# Patient Record
Sex: MALE | Race: WHITE | Employment: FULL TIME | URBAN - METROPOLITAN AREA
[De-identification: names, ages, dates, MRNs, and addresses within clinical notes are randomized per-mention and may not be internally consistent; named-entity substitution may affect disease eponyms.]

---

## 2019-01-07 ENCOUNTER — TRANSFERRED RECORDS (OUTPATIENT)
Dept: HEALTH INFORMATION MANAGEMENT | Facility: CLINIC | Age: 34
End: 2019-01-07

## 2019-02-18 ENCOUNTER — TRANSFERRED RECORDS (OUTPATIENT)
Dept: HEALTH INFORMATION MANAGEMENT | Facility: CLINIC | Age: 34
End: 2019-02-18

## 2019-02-25 ENCOUNTER — TELEPHONE (OUTPATIENT)
Dept: FAMILY MEDICINE | Facility: CLINIC | Age: 34
End: 2019-02-25

## 2019-02-25 NOTE — TELEPHONE ENCOUNTER
Patient wants to know if he could establish care with Dr. Miller.  States that his family sees her.  Please call or mychart decision to patient.  He is scheduled with Julian for a physical on Thursday because he needed to get in this week, but for future would like to be with Dr. Narvaez.

## 2019-02-28 ENCOUNTER — OFFICE VISIT (OUTPATIENT)
Dept: FAMILY MEDICINE | Facility: CLINIC | Age: 34
End: 2019-02-28
Payer: COMMERCIAL

## 2019-02-28 VITALS
TEMPERATURE: 97.6 F | SYSTOLIC BLOOD PRESSURE: 121 MMHG | WEIGHT: 154 LBS | DIASTOLIC BLOOD PRESSURE: 80 MMHG | OXYGEN SATURATION: 99 % | BODY MASS INDEX: 24.17 KG/M2 | RESPIRATION RATE: 12 BRPM | HEIGHT: 67 IN | HEART RATE: 55 BPM

## 2019-02-28 DIAGNOSIS — Z53.9 ERRONEOUS ENCOUNTER--DISREGARD: Primary | ICD-10-CM

## 2019-02-28 ASSESSMENT — ENCOUNTER SYMPTOMS
ABDOMINAL PAIN: 0
CHILLS: 0
PARESTHESIAS: 0
HEADACHES: 0
ARTHRALGIAS: 0
DIZZINESS: 0
WEAKNESS: 0
PALPITATIONS: 0
CONSTIPATION: 0
EYE PAIN: 0
JOINT SWELLING: 0
HEARTBURN: 0
HEMATOCHEZIA: 0
FREQUENCY: 0
NAUSEA: 0
NERVOUS/ANXIOUS: 0
FEVER: 0
DYSURIA: 0
SHORTNESS OF BREATH: 0
DIARRHEA: 0
COUGH: 0
SORE THROAT: 0
HEMATURIA: 0
MYALGIAS: 0

## 2019-02-28 ASSESSMENT — MIFFLIN-ST. JEOR: SCORE: 1589.23

## 2019-02-28 NOTE — PROGRESS NOTES
Patient was under the impression preoperative exam was needed prior to scheduling his surgery. Upon call of surgeon, the earliest date would be 4/3/19 which is greater than required 30 day time. Patient was informed he will need to return for a separate preop, but elects to cancel preventative physical today and will reschedule preop in the near future.     -Julian Murillo, DIDI  This encounter was opened in error. Please disregard.

## 2019-02-28 NOTE — PROGRESS NOTES
SUBJECTIVE:   CC: Ismael Britton is an 34 year old male who presents for preventative health visit.     Physical   Annual:     Getting at least 3 servings of Calcium per day:  Yes    Bi-annual eye exam:  Yes    Dental care twice a year:  Yes    Sleep apnea or symptoms of sleep apnea:  None    Diet:  Regular (no restrictions)    Frequency of exercise:  2-3 days/week    Duration of exercise:  45-60 minutes    Taking medications regularly:  Yes    Medication side effects:  None    Additional concerns today:  No    PHQ-2 Total Score: 0          Today's PHQ-2 Score:   PHQ-2 ( 1999 [employer]) 2/28/2019   Q1: Little interest or pleasure in doing things 0   Q2: Feeling down, depressed or hopeless 0   PHQ-2 Score 0   Q1: Little interest or pleasure in doing things Not at all   Q2: Feeling down, depressed or hopeless Not at all   PHQ-2 Score 0       Abuse: Current or Past(Physical, Sexual or Emotional)- No  Do you feel safe in your environment? Yes    Social History     Tobacco Use     Smoking status: Never Smoker     Smokeless tobacco: Never Used   Substance Use Topics     Alcohol use: No     Alcohol Use 2/28/2019   If you drink alcohol do you typically have greater than 3 drinks per day OR greater than 7 drinks per week? Not Applicable   {add AUDIT responses (Optional) (A score of 7 for adult men is an indication of hazardous drinking; a score of 8 or more is an indication of an alcohol use disorder.  A score of 7 or more for adult women is an indication of hazardous drinking or an alchohol use disorder):547633}    Last PSA: No results found for: PSA    Reviewed orders with patient. Reviewed health maintenance and updated orders accordingly - Yes  {Chronicprobdata (Optional):899018}    Reviewed and updated as needed this visit by clinical staff         Reviewed and updated as needed this visit by Provider        {HISTORY OPTIONS (Optional):178977}    Review of Systems   Constitutional: Negative for chills and fever.   HENT:  "Negative for congestion, ear pain, hearing loss and sore throat.    Eyes: Negative for pain and visual disturbance.   Respiratory: Negative for cough and shortness of breath.    Cardiovascular: Negative for chest pain, palpitations and peripheral edema.   Gastrointestinal: Negative for abdominal pain, constipation, diarrhea, heartburn, hematochezia and nausea.   Genitourinary: Negative for discharge, dysuria, frequency, genital sores, hematuria, impotence and urgency.   Musculoskeletal: Negative for arthralgias, joint swelling and myalgias.   Skin: Negative for rash.   Neurological: Negative for dizziness, weakness, headaches and paresthesias.   Psychiatric/Behavioral: Negative for mood changes. The patient is not nervous/anxious.      {MALE ROS (Optional):725251::\"CONSTITUTIONAL: NEGATIVE for fever, chills, change in weight\",\"INTEGUMENTARY/SKIN: NEGATIVE for worrisome rashes, moles or lesions\",\"EYES: NEGATIVE for vision changes or irritation\",\"ENT: NEGATIVE for ear, mouth and throat problems\",\"RESP: NEGATIVE for significant cough or SOB\",\"CV: NEGATIVE for chest pain, palpitations or peripheral edema\",\"GI: NEGATIVE for nausea, abdominal pain, heartburn, or change in bowel habits\",\" male: negative for dysuria, hematuria, decreased urinary stream, erectile dysfunction, urethral discharge\",\"MUSCULOSKELETAL: NEGATIVE for significant arthralgias or myalgia\",\"NEURO: NEGATIVE for weakness, dizziness or paresthesias\",\"PSYCHIATRIC: NEGATIVE for changes in mood or affect\"}    OBJECTIVE:   There were no vitals taken for this visit.    Physical Exam  {Exam Choices (Optional):428548}    {Diagnostic Test Results (Optional):330502::\"Diagnostic Test Results:\",\"none \"}    ASSESSMENT/PLAN:   {Dia Picklist:193976}    COUNSELING:   {MALE COUNSELING MESSAGES:875254::\"Reviewed preventive health counseling, as reflected in patient instructions\"}    BP Readings from Last 1 Encounters:   03/06/13 120/80     Estimated body mass index is " "25.31 kg/m  as calculated from the following:    Height as of 3/6/13: 1.695 m (5' 6.75\").    Weight as of 3/6/13: 72.8 kg (160 lb 6.4 oz).    {BP Counseling- Complete if BP >= 120/80  (Optional):786013}  {Weight Management Plan (ACO) Complete if BMI is abnormal-  Ages 18-64  BMI >24.9.  Age 65+ with BMI <23 or >30 (Optional):422270}     reports that  has never smoked. he has never used smokeless tobacco.  {Tobacco Cessation -- Complete if patient is a smoker (Optional):084732}    Counseling Resources:  ATP IV Guidelines  Pooled Cohorts Equation Calculator  FRAX Risk Assessment  ICSI Preventive Guidelines  Dietary Guidelines for Americans, 2010  USDA's MyPlate  ASA Prophylaxis  Lung CA Screening    Leonard Murillo PA-C  [facility]"

## 2019-03-25 ENCOUNTER — OFFICE VISIT (OUTPATIENT)
Dept: FAMILY MEDICINE | Facility: CLINIC | Age: 34
End: 2019-03-25
Payer: COMMERCIAL

## 2019-03-25 VITALS
SYSTOLIC BLOOD PRESSURE: 102 MMHG | HEIGHT: 67 IN | RESPIRATION RATE: 14 BRPM | BODY MASS INDEX: 24.64 KG/M2 | TEMPERATURE: 98.1 F | WEIGHT: 157 LBS | OXYGEN SATURATION: 98 % | HEART RATE: 62 BPM | DIASTOLIC BLOOD PRESSURE: 70 MMHG

## 2019-03-25 DIAGNOSIS — Z01.818 PREOP GENERAL PHYSICAL EXAM: Primary | ICD-10-CM

## 2019-03-25 DIAGNOSIS — J34.2 DEVIATED NASAL SEPTUM: ICD-10-CM

## 2019-03-25 DIAGNOSIS — J32.0 CHRONIC MAXILLARY SINUSITIS: ICD-10-CM

## 2019-03-25 DIAGNOSIS — D75.839 THROMBOCYTOSIS: ICD-10-CM

## 2019-03-25 LAB
BASOPHILS # BLD AUTO: 0.1 10E9/L (ref 0–0.2)
BASOPHILS NFR BLD AUTO: 0.6 %
DIFFERENTIAL METHOD BLD: ABNORMAL
EOSINOPHIL # BLD AUTO: 0.5 10E9/L (ref 0–0.7)
EOSINOPHIL NFR BLD AUTO: 5 %
ERYTHROCYTE [DISTWIDTH] IN BLOOD BY AUTOMATED COUNT: 13.2 % (ref 10–15)
HCT VFR BLD AUTO: 43.6 % (ref 40–53)
HGB BLD-MCNC: 15 G/DL (ref 13.3–17.7)
LYMPHOCYTES # BLD AUTO: 2.6 10E9/L (ref 0.8–5.3)
LYMPHOCYTES NFR BLD AUTO: 27.8 %
MCH RBC QN AUTO: 29.9 PG (ref 26.5–33)
MCHC RBC AUTO-ENTMCNC: 34.4 G/DL (ref 31.5–36.5)
MCV RBC AUTO: 87 FL (ref 78–100)
MONOCYTES # BLD AUTO: 0.8 10E9/L (ref 0–1.3)
MONOCYTES NFR BLD AUTO: 9.1 %
NEUTROPHILS # BLD AUTO: 5.3 10E9/L (ref 1.6–8.3)
NEUTROPHILS NFR BLD AUTO: 57.5 %
PLATELET # BLD AUTO: 710 10E9/L (ref 150–450)
RBC # BLD AUTO: 5.01 10E12/L (ref 4.4–5.9)
WBC # BLD AUTO: 9.2 10E9/L (ref 4–11)

## 2019-03-25 PROCEDURE — 85025 COMPLETE CBC W/AUTO DIFF WBC: CPT | Performed by: PHYSICIAN ASSISTANT

## 2019-03-25 PROCEDURE — 99204 OFFICE O/P NEW MOD 45 MIN: CPT | Performed by: PHYSICIAN ASSISTANT

## 2019-03-25 PROCEDURE — 36415 COLL VENOUS BLD VENIPUNCTURE: CPT | Performed by: PHYSICIAN ASSISTANT

## 2019-03-25 ASSESSMENT — MIFFLIN-ST. JEOR: SCORE: 1602.84

## 2019-03-25 NOTE — PROGRESS NOTES
[facility]  [address] [id number] [phone]  Dept: [phone]    PRE-OP EVALUATION:  Today's date: 3/25/2019    Ismael Britton (: 1985) presents for pre-operative evaluation assessment as requested by Dr. Arias.  He requires evaluation and anesthesia risk assessment prior to undergoing surgery/procedure for treatment of nose .    Fax number for surgical facility: [phone] and [phone]  Primary Physician: Lady Gonzales  Type of Anesthesia Anticipated: General    Patient has a Health Care Directive or Living Will:  NO    Preop Questions 3/25/2019   1.  Do you have a history of Heart attack, stroke, stent, coronary bypass surgery, or other heart surgery? No   2.  Do you ever have any pain or discomfort in your chest? No   3.  Do you have a history of  Heart Failure? No   4.   Are you troubled by shortness of breath when:  walking on a level surface, or up a slight hill, or at night? No   5.  Do you currently have a cold, bronchitis or other respiratory infection? No   6.  Do you have a cough, shortness of breath, or wheezing? No   7.  Do you sometimes get pains in the calves of your legs when you walk? No   8. Do you or anyone in your family have previous history of blood clots? No   9.  Do you or does anyone in your family have a serious bleeding problem such as prolonged bleeding following surgeries or cuts? No   10. Have you ever had problems with anemia or been told to take iron pills? No   11. Have you had any abnormal blood loss such as black, tarry or bloody stools? No   12. Have you ever had a blood transfusion? No   13. Have you or any of your relatives ever had problems with anesthesia? No   14. Do you have sleep apnea, excessive snoring or daytime drowsiness? No   15. Do you have any prosthetic heart valves? No   16. Do you have prosthetic joints? No         HPI:     HPI related to upcoming procedure: history of chronic sinusitis  and deviated septum. The above procedure was deemed the next best step in management.       MEDICAL HISTORY:     Patient Active Problem List    Diagnosis Date Noted     CARDIOVASCULAR SCREENING; LDL GOAL LESS THAN 160 10/31/2010     Priority: Medium     Anxiety 09/10/2010     Priority: Medium     Intermittent asthma 04/06/2010     Priority: Medium     Quality NRK        Past Medical History:   Diagnosis Date     Anxiety 9/2010     Asthma, mild intermittent      No past surgical history on file.  Current Outpatient Medications   Medication Sig Dispense Refill     cetirizine (ZYRTEC) 10 MG tablet Take 1 tablet by mouth every evening. 30 tablet 1     fluticasone (FLONASE) 50 MCG/ACT nasal spray 2 sprays by Both Nostrils route daily. 1 Package 12     OTC products: no recent use of OTC ASA, NSAIDS or Steroids    No Known Allergies   Latex Allergy: NO    Social History     Tobacco Use     Smoking status: Never Smoker     Smokeless tobacco: Never Used   Substance Use Topics     Alcohol use: No     History   Drug Use No       REVIEW OF SYSTEMS:   CONSTITUTIONAL: NEGATIVE for fever, chills, change in weight  INTEGUMENTARY/SKIN: NEGATIVE for worrisome rashes, moles or lesions  EYES: NEGATIVE for vision changes or irritation  ENT/MOUTH: chronic congestion. Otherwise, NEGATIVE for ear, mouth and throat problems  RESP: NEGATIVE for significant cough or SOB  CV: NEGATIVE for chest pain, palpitations or peripheral edema  GI: NEGATIVE for nausea, abdominal pain, heartburn, or change in bowel habits  : NEGATIVE for frequency, dysuria, or hematuria  MUSCULOSKELETAL: NEGATIVE for significant arthralgias or myalgia  NEURO: NEGATIVE for weakness, dizziness or paresthesias  ENDOCRINE: NEGATIVE for temperature intolerance, skin/hair changes  HEME: NEGATIVE for bleeding problems  PSYCHIATRIC: NEGATIVE for changes in mood or affect    EXAM:   /70 (BP Location: Right arm, Patient Position: Chair, Cuff Size: Adult Regular)   Pulse 62  "  Temp 98.1  F (36.7  C) (Oral)   Resp 14   Ht 1.689 m (5' 6.5\")   Wt 71.2 kg (157 lb)   SpO2 98%   BMI 24.96 kg/m      GENERAL APPEARANCE: healthy, alert and no distress     EYES: EOMI,  PERRL     HENT: ear canals and TM's normal and nasal mucosa with edema and pale     NECK: no adenopathy, no asymmetry, masses, or scars and thyroid normal to palpation     RESP: lungs clear to auscultation - no rales, rhonchi or wheezes     CV: regular rates and rhythm, normal S1 S2, no S3 or S4 and no murmur, click or rub     ABDOMEN:  soft, nontender, no HSM or masses and bowel sounds normal     MS: extremities normal- no gross deformities noted, no evidence of inflammation in joints, FROM in all extremities.     SKIN: no suspicious lesions or rashes     NEURO: Normal strength and tone, sensory exam grossly normal, mentation intact and speech normal     PSYCH: mentation appears normal. and affect normal/bright     LYMPHATICS: No cervical adenopathy    DIAGNOSTICS:     EKG: Not indicated due to non-vascular surgery and low risk of event (age <65 and without cardiac risk factors)  Labs Resulted Today:   Results for orders placed or performed in visit on 03/25/19   CBC with platelets and differential   Result Value Ref Range    WBC 9.2 4.0 - 11.0 10e9/L    RBC Count 5.01 4.4 - 5.9 10e12/L    Hemoglobin 15.0 13.3 - 17.7 g/dL    Hematocrit 43.6 40.0 - 53.0 %    MCV 87 78 - 100 fl    MCH 29.9 26.5 - 33.0 pg    MCHC 34.4 31.5 - 36.5 g/dL    RDW 13.2 10.0 - 15.0 %    Platelet Count 710 (H) 150 - 450 10e9/L    % Neutrophils 57.5 %    % Lymphocytes 27.8 %    % Monocytes 9.1 %    % Eosinophils 5.0 %    % Basophils 0.6 %    Absolute Neutrophil 5.3 1.6 - 8.3 10e9/L    Absolute Lymphocytes 2.6 0.8 - 5.3 10e9/L    Absolute Monocytes 0.8 0.0 - 1.3 10e9/L    Absolute Eosinophils 0.5 0.0 - 0.7 10e9/L    Absolute Basophils 0.1 0.0 - 0.2 10e9/L    Diff Method Automated Method        IMPRESSION:   Reason for surgery/procedure: chronic sinusitis " and deviated septum.   Diagnosis/reason for consult: preoperative exam for the above procedure.     The proposed surgical procedure is considered INTERMEDIATE risk.    REVISED CARDIAC RISK INDEX  The patient has the following serious cardiovascular risks for perioperative complications such as (MI, PE, VFib and 3  AV Block):  No serious cardiac risks  INTERPRETATION: 0 risks: Class I (very low risk - 0.4% complication rate)    The patient has the following additional risks for perioperative complications:  No identified additional risks      ICD-10-CM    1. Preop general physical exam Z01.818 CBC with platelets and differential     CANCELED: Hemoglobin   2. Chronic maxillary sinusitis J32.0    3. Deviated nasal septum J34.2        RECOMMENDATIONS:     NPO after midnight. No nsaids or asa until after procedure. No alcohol 24 hours prior.     Of note, patient's platelet count was elevated. Unclear cause of symptoms. No recent illness or blood loss. Possible lab error. Should not impact surgery, but having patient return to clinic later this week for recheck.     APPROVAL GIVEN to proceed with proposed procedure, without further diagnostic evaluation       Signed Electronically by: Leonard Murillo PA-C    Copy of this evaluation report is provided to requesting physician.    [facility] Preop Guidelines    Revised Cardiac Risk Index

## 2019-03-25 NOTE — PATIENT INSTRUCTIONS
Before Your Surgery      Call your surgeon if there is any change in your health. This includes signs of a cold or flu (such as a sore throat, runny nose, cough, rash or fever).    Do not smoke, drink alcohol or take over the counter medicine (unless your surgeon or primary care doctor tells you to) for the 24 hours before and after surgery.    If you take prescribed drugs: Follow your doctor s orders about which medicines to take and which to stop until after surgery.    Eating and drinking prior to surgery: follow the instructions from your surgeon    Take a shower or bath the night before surgery. Use the soap your surgeon gave you to gently clean your skin. If you do not have soap from your surgeon, use your regular soap. Do not shave or scrub the surgery site.  Wear clean pajamas and have clean sheets on your bed.     Fax preop Notes to:  [phone]  & [phone]

## 2019-03-25 NOTE — LETTER
My Asthma Action Plan  Name: Ismael Britton   YOB: 1985  Date: 3/25/2019   My doctor: Leonard uMrillo PA-C   My clinic: [facility]        My Control Medicine:   My Rescue Medicine:    My Asthma Severity:   Avoid your asthma triggers:                GREEN ZONE   Good Control    I feel good    No cough or wheeze    Can work, sleep and play without asthma symptoms       Take your asthma control medicine every day.     1. If exercise triggers your asthma, take your rescue medication    15 minutes before exercise or sports, and    During exercise if you have asthma symptoms  2. Spacer to use with inhaler: If you have a spacer, make sure to use it with your inhaler             YELLOW ZONE Getting Worse  I have ANY of these:    I do not feel good    Cough or wheeze    Chest feels tight    Wake up at night   1. Keep taking your Green Zone medications  2. Start taking your rescue medicine:    every 20 minutes for up to 1 hour. Then every 4 hours for 24-48 hours.  3. If you stay in the Yellow Zone for more than 12-24 hours, contact your doctor.  4. If you do not return to the Green Zone in 12-24 hours or you get worse, start taking your oral steroid medicine if prescribed by your provider.           RED ZONE Medical Alert - Get Help  I have ANY of these:    I feel awful    Medicine is not helping    Breathing getting harder    Trouble walking or talking    Nose opens wide to breathe       1. Take your rescue medicine NOW  2. If your provider has prescribed an oral steroid medicine, start taking it NOW  3. Call your doctor NOW  4. If you are still in the Red Zone after 20 minutes and you have not reached your doctor:    Take your rescue medicine again and    Call 911 or go to the emergency room right away    See your regular doctor within 2 weeks of an Emergency Room or Urgent Care visit for follow-up treatment.          Annual Reminders:  Meet with Asthma Educator,  Flu Shot in the  Fall, consider Pneumonia Vaccination for patients with asthma (aged 19 and older).    Pharmacy: [facility] [id number] [place] [address] [id number] AT [facility] & [place] [address]                      Asthma Triggers  How To Control Things That Make Your Asthma Worse    Triggers are things that make your asthma worse.  Look at the list below to help you find your triggers and what you can do about them.  You can help prevent asthma flare-ups by staying away from your triggers.      Trigger                                                          What you can do   Cigarette Smoke  Tobacco smoke can make asthma worse. Do not allow smoking in your home, car or around you.  Be sure no one smokes at a child s day care or school.  If you smoke, ask your health care provider for ways to help you quit.  Ask family members to quit too.  Ask your health care provider for a referral to Quit Plan to help you quit smoking, or call 4-124-501-PLAN.     Colds, Flu, Bronchitis  These are common triggers of asthma. Wash your hands often.  Don t touch your eyes, nose or mouth.  Get a flu shot every year.     Dust Mites  These are tiny bugs that live in cloth or carpet. They are too small to see. Wash sheets and blankets in hot water every week.   Encase pillows and mattress in dust mite proof covers.  Avoid having carpet if you can. If you have carpet, vacuum weekly.   Use a dust mask and HEPA vacuum.   Pollen and Outdoor Mold  Some people are allergic to trees, grass, or weed pollen, or molds. Try to keep your windows closed.  Limit time out doors when pollen count is high.   Ask you health care provider about taking medicine during allergy season.     Animal Dander  Some people are allergic to skin flakes, urine or saliva from pets with fur or feathers. Keep pets with fur or feathers out of your home.    If you can t keep the pet outdoors, then keep the pet out of your bedroom.  Keep the bedroom door closed.  Keep pets  off cloth furniture and away from stuffed toys.     Mice, Rats, and Cockroaches  Some people are allergic to the waste from these pests.   Cover food and garbage.  Clean up spills and food crumbs.  Store grease in the refrigerator.   Keep food out of the bedroom.   Indoor Mold  This can be a trigger if your home has high moisture. Fix leaking faucets, pipes, or other sources of water.   Clean moldy surfaces.  Dehumidify basement if it is damp and smelly.   Smoke, Strong Odors, and Sprays  These can reduce air quality. Stay away from strong odors and sprays, such as perfume, powder, hair spray, paints, smoke incense, paint, cleaning products, candles and new carpet.   Exercise or Sports  Some people with asthma have this trigger. Be active!  Ask your doctor about taking medicine before sports or exercise to prevent symptoms.    Warm up for 5-10 minutes before and after sports or exercise.     Other Triggers of Asthma  Cold air:  Cover your nose and mouth with a scarf.  Sometimes laughing or crying can be a trigger.  Some medicines and food can trigger asthma.

## 2019-03-26 ENCOUNTER — TELEPHONE (OUTPATIENT)
Dept: FAMILY MEDICINE | Facility: CLINIC | Age: 34
End: 2019-03-26

## 2019-03-26 DIAGNOSIS — D75.839 THROMBOCYTOSIS: ICD-10-CM

## 2019-03-26 LAB — PLATELET # BLD AUTO: 697 10E9/L (ref 150–450)

## 2019-03-26 PROCEDURE — 36415 COLL VENOUS BLD VENIPUNCTURE: CPT | Performed by: PHYSICIAN ASSISTANT

## 2019-03-26 PROCEDURE — 85049 AUTOMATED PLATELET COUNT: CPT | Performed by: PHYSICIAN ASSISTANT

## 2019-03-26 ASSESSMENT — ASTHMA QUESTIONNAIRES: ACT_TOTALSCORE: 25

## 2019-03-26 NOTE — TELEPHONE ENCOUNTER
Pt as instructed to have the platelet count rechecked later this week but came back the next day?   Component      Latest Ref Rng & Units 3/25/2019 3/26/2019   Platelet Count      150 - 450 10e9/L 710 (H) 697 (H)     Too soon?   Speedy Cardoza, RN

## 2019-03-26 NOTE — TELEPHONE ENCOUNTER
See result note from today. Did not see telephone note until after this was completed.     -Julian Murillo, PAC

## 2019-03-26 NOTE — TELEPHONE ENCOUNTER
Patient called looking for lab results from his labs done today, told him that they will contact him once the provider has made a comment.     Please see if Julian can take a look at this afternoon and call patient with results     Shante Calderon/NAYAN

## 2019-03-29 ENCOUNTER — TELEPHONE (OUTPATIENT)
Dept: ONCOLOGY | Facility: CLINIC | Age: 34
End: 2019-03-29

## 2019-03-29 DIAGNOSIS — D75.839 THROMBOCYTOSIS: ICD-10-CM

## 2019-03-29 LAB — PLATELET # BLD AUTO: 684 10E9/L (ref 150–450)

## 2019-03-29 PROCEDURE — 85049 AUTOMATED PLATELET COUNT: CPT | Performed by: PHYSICIAN ASSISTANT

## 2019-03-29 PROCEDURE — 36415 COLL VENOUS BLD VENIPUNCTURE: CPT | Performed by: PHYSICIAN ASSISTANT

## 2019-03-29 NOTE — TELEPHONE ENCOUNTER
ONCOLOGY INTAKE: Records Information      APPT INFORMATION: 4/12/19 at 1:15PM  Referring provider:  ISAAC Cuevas  Referring provider s clinic:  [facility]  Reason for visit/diagnosis:  Thrombocytosis    Were the records received with the referral (via [employer])? In [facility]    Has patient been seen for any external appt for this diagnosis (enter clinic/location)? No - per pt he has not been seen or treated for this outside of .

## 2019-04-06 NOTE — TELEPHONE ENCOUNTER
RECORDS STATUS - ALL OTHER DIAGNOSIS      RECORDS RECEIVED FROM: [facility]   DATE RECEIVED: 4/6/19   NOTES STATUS DETAILS   OFFICE NOTE from referring provider Complete [facility] 3/25 Dr. Murillo   OFFICE NOTE from medical oncologist N/A    DISCHARGE SUMMARY from hospital N/A    DISCHARGE REPORT from the ER N/A    OPERATIVE REPORT N/A    MEDICATION LIST Complete [facility]   CLINICAL TRIAL TREATMENTS TO DATE N/A    LABS     PATHOLOGY REPORTS N/A    ANYTHING RELATED TO DIAGNOSIS Complete Epic   GENONOMIC TESTING     TYPE: N/A    IMAGING (NEED IMAGES & REPORT)     CT SCANS N/A    MRI N/A    MAMMO N/A    ULTRASOUND N/A    PET N/A

## 2019-04-11 ENCOUNTER — PATIENT OUTREACH (OUTPATIENT)
Dept: ONCOLOGY | Facility: CLINIC | Age: 34
End: 2019-04-11

## 2019-04-11 NOTE — PROGRESS NOTES
Ismael called into the cancer clinic to verify appointment tomorrow with Dr. Alejo at 1:15pm. This writer verified and confirmed with the patient to arrive by 1pm. Ismael also inquired about his previous labs that were drawn. This writer notified him that his platelet count is high but he can ask Dr. Alejo tomorrow to go over the results. The patient verbalized understanding.     Mitzi Doran RN on 4/11/2019 at 4:01 PM  Mitzi Doran RN, BSN, [facility], LAc  Patient Care Coordinator  [facility]  [phone]

## 2019-04-12 ENCOUNTER — PRE VISIT (OUTPATIENT)
Dept: ONCOLOGY | Facility: CLINIC | Age: 34
End: 2019-04-12

## 2019-04-12 ENCOUNTER — HOSPITAL ENCOUNTER (OUTPATIENT)
Facility: CLINIC | Age: 34
Setting detail: SPECIMEN
Discharge: HOME | End: 2019-04-12
Attending: INTERNAL MEDICINE | Admitting: INTERNAL MEDICINE
Payer: COMMERCIAL

## 2019-04-12 ENCOUNTER — ONCOLOGY VISIT (OUTPATIENT)
Dept: ONCOLOGY | Facility: CLINIC | Age: 34
End: 2019-04-12
Attending: PHYSICIAN ASSISTANT
Payer: COMMERCIAL

## 2019-04-12 VITALS
TEMPERATURE: 98.8 F | BODY MASS INDEX: 24.23 KG/M2 | DIASTOLIC BLOOD PRESSURE: 72 MMHG | SYSTOLIC BLOOD PRESSURE: 122 MMHG | WEIGHT: 154.4 LBS | HEIGHT: 67 IN | HEART RATE: 60 BPM | OXYGEN SATURATION: 99 % | RESPIRATION RATE: 16 BRPM

## 2019-04-12 DIAGNOSIS — D75.839 THROMBOCYTOSIS: Primary | ICD-10-CM

## 2019-04-12 LAB
BASOPHILS # BLD AUTO: 0.1 10E9/L (ref 0–0.2)
BASOPHILS NFR BLD AUTO: 1.4 %
COPATH REPORT: NORMAL
CRP SERPL-MCNC: <2.9 MG/L (ref 0–8)
DIFFERENTIAL METHOD BLD: ABNORMAL
EOSINOPHIL # BLD AUTO: 0.4 10E9/L (ref 0–0.7)
EOSINOPHIL NFR BLD AUTO: 3.7 %
ERYTHROCYTE [DISTWIDTH] IN BLOOD BY AUTOMATED COUNT: 12.5 % (ref 10–15)
ERYTHROCYTE [SEDIMENTATION RATE] IN BLOOD BY WESTERGREN METHOD: 2 MM/H (ref 0–15)
FERRITIN SERPL-MCNC: 180 NG/ML (ref 26–388)
HCT VFR BLD AUTO: 46.7 % (ref 40–53)
HGB BLD-MCNC: 15.7 G/DL (ref 13.3–17.7)
IMM GRANULOCYTES # BLD: 0.1 10E9/L (ref 0–0.4)
IMM GRANULOCYTES NFR BLD: 0.5 %
IRON SATN MFR SERPL: 42 % (ref 15–46)
IRON SERPL-MCNC: 120 UG/DL (ref 35–180)
LDH SERPL L TO P-CCNC: 194 U/L (ref 85–227)
LYMPHOCYTES # BLD AUTO: 2.5 10E9/L (ref 0.8–5.3)
LYMPHOCYTES NFR BLD AUTO: 24.8 %
MCH RBC QN AUTO: 29.6 PG (ref 26.5–33)
MCHC RBC AUTO-ENTMCNC: 33.6 G/DL (ref 31.5–36.5)
MCV RBC AUTO: 88 FL (ref 78–100)
MONOCYTES # BLD AUTO: 0.9 10E9/L (ref 0–1.3)
MONOCYTES NFR BLD AUTO: 8.9 %
NEUTROPHILS # BLD AUTO: 6.2 10E9/L (ref 1.6–8.3)
NEUTROPHILS NFR BLD AUTO: 60.7 %
NRBC # BLD AUTO: 0 10*3/UL
NRBC BLD AUTO-RTO: 0 /100
PLATELET # BLD AUTO: 799 10E9/L (ref 150–450)
RBC # BLD AUTO: 5.31 10E12/L (ref 4.4–5.9)
RETICS # AUTO: 57.3 10E9/L (ref 25–95)
RETICS/RBC NFR AUTO: 1.1 % (ref 0.5–2)
TIBC SERPL-MCNC: 283 UG/DL (ref 240–430)
VIT B12 SERPL-MCNC: 1493 PG/ML (ref 193–986)
WBC # BLD AUTO: 10.3 10E9/L (ref 4–11)

## 2019-04-12 PROCEDURE — 85025 COMPLETE CBC W/AUTO DIFF WBC: CPT | Performed by: INTERNAL MEDICINE

## 2019-04-12 PROCEDURE — 82607 VITAMIN B-12: CPT | Performed by: INTERNAL MEDICINE

## 2019-04-12 PROCEDURE — 82728 ASSAY OF FERRITIN: CPT | Performed by: INTERNAL MEDICINE

## 2019-04-12 PROCEDURE — 81450 HL NEO GSAP 5-50DNA/DNA&RNA: CPT | Performed by: INTERNAL MEDICINE

## 2019-04-12 PROCEDURE — 85060 BLOOD SMEAR INTERPRETATION: CPT | Performed by: INTERNAL MEDICINE

## 2019-04-12 PROCEDURE — 85045 AUTOMATED RETICULOCYTE COUNT: CPT | Performed by: INTERNAL MEDICINE

## 2019-04-12 PROCEDURE — 84238 ASSAY NONENDOCRINE RECEPTOR: CPT | Performed by: INTERNAL MEDICINE

## 2019-04-12 PROCEDURE — 83550 IRON BINDING TEST: CPT | Performed by: INTERNAL MEDICINE

## 2019-04-12 PROCEDURE — 99204 OFFICE O/P NEW MOD 45 MIN: CPT | Performed by: INTERNAL MEDICINE

## 2019-04-12 PROCEDURE — G0463 HOSPITAL OUTPT CLINIC VISIT: HCPCS

## 2019-04-12 PROCEDURE — 83615 LACTATE (LD) (LDH) ENZYME: CPT | Performed by: INTERNAL MEDICINE

## 2019-04-12 PROCEDURE — 40000847 ZZHCL STATISTIC MORPHOLOGY W/INTERP HISTOLOGY TC 85060: Performed by: INTERNAL MEDICINE

## 2019-04-12 PROCEDURE — 86140 C-REACTIVE PROTEIN: CPT | Performed by: INTERNAL MEDICINE

## 2019-04-12 PROCEDURE — 85652 RBC SED RATE AUTOMATED: CPT | Performed by: INTERNAL MEDICINE

## 2019-04-12 PROCEDURE — 83540 ASSAY OF IRON: CPT | Performed by: INTERNAL MEDICINE

## 2019-04-12 ASSESSMENT — MIFFLIN-ST. JEOR: SCORE: 1591.04

## 2019-04-12 ASSESSMENT — PAIN SCALES - GENERAL: PAINLEVEL: MILD PAIN (2)

## 2019-04-12 NOTE — NURSING NOTE
"Oncology Rooming Note    April 12, 2019 1:02 PM   Ismael Britton is a 34 year old male who presents for:    Chief Complaint   Patient presents with     Oncology Clinic Visit     New Patient Consult     Initial Vitals: /72   Pulse 60   Temp 98.8  F (37.1  C) (Tympanic)   Resp 16   Ht 1.689 m (5' 6.5\")   Wt 70 kg (154 lb 6.4 oz)   SpO2 99%   BMI 24.55 kg/m   Estimated body mass index is 24.55 kg/m  as calculated from the following:    Height as of this encounter: 1.689 m (5' 6.5\").    Weight as of this encounter: 70 kg (154 lb 6.4 oz). Body surface area is 1.81 meters squared.  Mild Pain (2) Comment: Data Unavailable   No LMP for male patient.  Allergies reviewed: Yes  Medications reviewed: Yes    Medications: Medication refills not needed today.  Pharmacy name entered into [employer]: [facility] [address] [place] [address] AT [place] [address]    Clinical concerns: New Patient Consult       Katie Rivera CMA              "

## 2019-04-12 NOTE — LETTER
4/12/2019         RE: Ismael Britton  [address] [facility] [address]        Dear Colleague,    Thank you for referring your patient, Ismael Britton, to the [facility]. Please see a copy of my visit note below.    [facility]    NEW PATIENT VISIT NOTE    PATIENT NAME: Ismael Britton MRN # [id number]  DATE OF VISIT: April 12, 2019 YOB: 1985    REFERRING PROVIDER: Leonard Murillo PA-C  [address] [place] [address]     HISTORY OF PRESENT ILLNESS   Ismael Britton is 34 year old male with no PMH who has been referred to Hematology service for thrombocytosis.     He had a sinus surgery last week - Wednesay. He had CBC as part of pre-op the week prior and was noted to have elevated PLT. His PLT count has been rechecked and it has been elevated leading to the referral to clinic today.     He is doing well and is back to his baseline. He has been reading about thrombocytosis and is worried.     He does not drink or smoke. He regularly exercises 3-4 times a week. He eats healthy.     He denies any chronic illness. He has not had any exacerbation for asthma for the last 10 years. It is now presumed to be due to allergic reaction to cats. He has been following with allergist and has a breathing test every 6 months.     He suspects he has hemorrhoids or fissures - otherwise he is doing fine.     He has not taken his Flonase for a couple of weeks but has been using it intermittently for the last couple of years. He has not used it since Christmas.     He has not noticed flushing, low grade fevers, weight loss. He has good appetite and energy. He takes in a lot of proteins.      PAST MEDICAL HISTORY     Past Medical History:   Diagnosis Date     Anxiety 9/2010     Asthma, mild intermittent           CURRENT OUTPATIENT MEDICATIONS     Current Outpatient Medications   Medication Sig     cetirizine (ZYRTEC) 10 MG tablet Take 1  tablet by mouth every evening.     fluticasone (FLONASE) 50 MCG/ACT nasal spray 2 sprays by Both Nostrils route daily.     No current facility-administered medications for this visit.         ALLERGIES    No Known Allergies     SOCIAL HISTORY   He is  and lives with his wife. He has 4 kids and they live with him.     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      FAMILY HISTORY   Father had CAD and needed CABG; paternal grandmother was smoker       REVIEW OF SYSTEMS   As above in the HPI, o/w complete 12-point ROS was negative.     PHYSICAL EXAM   B/P: 122/72, T: 98.8, P: 60, R: 16  @LASTSAO2(4)@  Wt Readings from Last 3 Encounters:   04/12/19 70 kg (154 lb 6.4 oz)   03/25/19 71.2 kg (157 lb)   02/28/19 69.9 kg (154 lb)     GEN: NAD  HEENT: PERRL, EOMI, no icterus, injection or pallor. Oropharynx is clear.  NECK: no cervical or supraclavicular lymphadenopathy  LUNGS: clear bilaterally  CV: regular, no murmurs, rubs, or gallops  ABDOMEN: soft, non-tender, non-distended, normal bowel sounds, no hepatosplenomegaly by percussion or palpation  EXT: warm, well perfused, no edema  NEURO: alert  SKIN: no rashes     LABORATORY AND IMAGING STUDIES   No results for input(s): NA, POTASSIUM, CHLORIDE, CO2, ANIONGAP, BUN, CR, GLC, KAHLIL in the last 37068 hours.  No results for input(s): MAG, PHOS in the last 07688 hours.  Recent Labs   Lab Test 03/29/19  0825 03/26/19  0809 03/25/19  1029   WBC  --   --  9.2   HGB  --   --  15.0   * 697* 710*   MCV  --   --  87   NEUTROPHIL  --   --  57.5     No results for input(s): BILITOTAL, ALKPHOS, ALT, AST, ALBUMIN, LDH in the last 78717 hours.  TSH   Date Value Ref Range Status   09/02/2010 1.28 0.4 - 5.0 mU/L Final     No results for input(s): CEA in the last 27486 hours.  No results found for this or any previous visit.    No results found for: PATH    No results found for this or any previous visit.       ASSESSMENT    1. Thrombocytosis  2. ECOG PS 0  3. No other medical comorbidity    DISCUSSION   I had a lengthy discussion with Ismael. I explained him the role of PLT as first line of defense against bleeding. The struggle for mankind over the course of evolution has been fighting against infections and bleeding to death. Hence with any for of stress we have release of steroids which increase both the neutrophils to fight infection and PLT to prevent bleeding to death.     Any form of blood loss or iron deficiency anemia can trigger thrombocytosis. Also thrombocytosis occurs in the setting of infections or other forms of inflammation like auto-immune disease - SLE/RA and so on. Trauma, allergic response to medications or malignancies can also cause an increase in PLT counts.     These are all secondary causes of increase in PLT counts which can rise due to primary PLT disorder - referred as essential thrombocythemia. Thrombocytosis by itself can present with  erythromelalgia, flushing, pruritus, low grade fevers, sweats, or weight loss.     He is not on any medications. He does not have any infections, trauma or other illness/stressor other than the recent sinus surgery. Unfortunately we do not have too many PLT values to work with. His only PLT count prior to the last 6 weeks is from 2010 when it was clearly in the normal range. He denies any recent use of steroids despite his chronic sinusitis. He denies using flonase in the last several weeks. It is inhaled steroids which should not cause a systemic response but I have seen systemic responses to medications including steroids injected to joints and also used as eye drops. If we had any steroid use, I would have favored just following his labs in 3 months and deferred evaluation till then. But given his significantly high PLT counts, I will work it up.       PLAN   - Significant thrombocytosis without any obvious cause other than recent sinusitis   - I will work  him up for his thrombocytosis  - I will follow him about 3 weeks time to review labs     Over 45 min of direct face to face time spent with patient with more than 50% time spent in counseling and coordinating care.      Charlie Smith ,  Division of Hematology, Oncology & Transplantation  [facility].       Again, thank you for allowing me to participate in the care of your patient.        Sincerely,        Charlie Alejo MD

## 2019-04-12 NOTE — PROGRESS NOTES
[facility]    NEW PATIENT VISIT NOTE    PATIENT NAME: Ismael Britton MRN # [id number]  DATE OF VISIT: April 12, 2019 YOB: 1985    REFERRING PROVIDER: Leonard Murillo PA-C  [address] [place] [address]     HISTORY OF PRESENT ILLNESS   Ismael Britton is 34 year old male with no PMH who has been referred to Hematology service for thrombocytosis.     He had a sinus surgery last week - Wednesay. He had CBC as part of pre-op the week prior and was noted to have elevated PLT. His PLT count has been rechecked and it has been elevated leading to the referral to clinic today.     He is doing well and is back to his baseline. He has been reading about thrombocytosis and is worried.     He does not drink or smoke. He regularly exercises 3-4 times a week. He eats healthy.     He denies any chronic illness. He has not had any exacerbation for asthma for the last 10 years. It is now presumed to be due to allergic reaction to cats. He has been following with allergist and has a breathing test every 6 months.     He suspects he has hemorrhoids or fissures - otherwise he is doing fine.     He has not taken his Flonase for a couple of weeks but has been using it intermittently for the last couple of years. He has not used it since Christmas.     He has not noticed flushing, low grade fevers, weight loss. He has good appetite and energy. He takes in a lot of proteins.      PAST MEDICAL HISTORY     Past Medical History:   Diagnosis Date     Anxiety 9/2010     Asthma, mild intermittent           CURRENT OUTPATIENT MEDICATIONS     Current Outpatient Medications   Medication Sig     cetirizine (ZYRTEC) 10 MG tablet Take 1 tablet by mouth every evening.     fluticasone (FLONASE) 50 MCG/ACT nasal spray 2 sprays by Both Nostrils route daily.     No current facility-administered medications for this visit.         ALLERGIES    No Known Allergies     SOCIAL HISTORY   He is   and lives with his wife. He has 4 kids and they live with him.     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      FAMILY HISTORY   Father had CAD and needed CABG; paternal grandmother was smoker       REVIEW OF SYSTEMS   As above in the HPI, o/w complete 12-point ROS was negative.     PHYSICAL EXAM   B/P: 122/72, T: 98.8, P: 60, R: 16  @LASTSAO2(4)@  Wt Readings from Last 3 Encounters:   04/12/19 70 kg (154 lb 6.4 oz)   03/25/19 71.2 kg (157 lb)   02/28/19 69.9 kg (154 lb)     GEN: NAD  HEENT: PERRL, EOMI, no icterus, injection or pallor. Oropharynx is clear.  NECK: no cervical or supraclavicular lymphadenopathy  LUNGS: clear bilaterally  CV: regular, no murmurs, rubs, or gallops  ABDOMEN: soft, non-tender, non-distended, normal bowel sounds, no hepatosplenomegaly by percussion or palpation  EXT: warm, well perfused, no edema  NEURO: alert  SKIN: no rashes     LABORATORY AND IMAGING STUDIES   No results for input(s): NA, POTASSIUM, CHLORIDE, CO2, ANIONGAP, BUN, CR, GLC, KAHLIL in the last 54902 hours.  No results for input(s): MAG, PHOS in the last 55424 hours.  Recent Labs   Lab Test 03/29/19  0825 03/26/19  0809 03/25/19  1029   WBC  --   --  9.2   HGB  --   --  15.0   * 697* 710*   MCV  --   --  87   NEUTROPHIL  --   --  57.5     No results for input(s): BILITOTAL, ALKPHOS, ALT, AST, ALBUMIN, LDH in the last 89095 hours.  TSH   Date Value Ref Range Status   09/02/2010 1.28 0.4 - 5.0 mU/L Final     No results for input(s): CEA in the last 15215 hours.  No results found for this or any previous visit.    No results found for: PATH    No results found for this or any previous visit.      ASSESSMENT    1. Thrombocytosis  2. ECOG PS 0  3. No other medical comorbidity    DISCUSSION   I had a lengthy discussion with Ismael. I explained him the role of PLT as first line of defense against bleeding. The struggle for mankind over the course of  evolution has been fighting against infections and bleeding to death. Hence with any for of stress we have release of steroids which increase both the neutrophils to fight infection and PLT to prevent bleeding to death.     Any form of blood loss or iron deficiency anemia can trigger thrombocytosis. Also thrombocytosis occurs in the setting of infections or other forms of inflammation like auto-immune disease - SLE/RA and so on. Trauma, allergic response to medications or malignancies can also cause an increase in PLT counts.     These are all secondary causes of increase in PLT counts which can rise due to primary PLT disorder - referred as essential thrombocythemia. Thrombocytosis by itself can present with  erythromelalgia, flushing, pruritus, low grade fevers, sweats, or weight loss.     He is not on any medications. He does not have any infections, trauma or other illness/stressor other than the recent sinus surgery. Unfortunately we do not have too many PLT values to work with. His only PLT count prior to the last 6 weeks is from 2010 when it was clearly in the normal range. He denies any recent use of steroids despite his chronic sinusitis. He denies using flonase in the last several weeks. It is inhaled steroids which should not cause a systemic response but I have seen systemic responses to medications including steroids injected to joints and also used as eye drops. If we had any steroid use, I would have favored just following his labs in 3 months and deferred evaluation till then. But given his significantly high PLT counts, I will work it up.       PLAN   - Significant thrombocytosis without any obvious cause other than recent sinusitis   - I will work him up for his thrombocytosis  - I will follow him about 3 weeks time to review labs     Over 45 min of direct face to face time spent with patient with more than 50% time spent in counseling and coordinating care.      Charlie Alejo  Adj Assistant  Professor,  Division of Hematology, Oncology & Transplantation  [facility].

## 2019-04-13 LAB — STFR SERPL-SCNC: 2.2 MG/L (ref 2.2–5)

## 2019-04-23 LAB — COPATH REPORT: NORMAL

## 2019-05-03 ENCOUNTER — HOSPITAL ENCOUNTER (OUTPATIENT)
Facility: CLINIC | Age: 34
Setting detail: SPECIMEN
Discharge: HOME | End: 2019-05-03
Attending: INTERNAL MEDICINE | Admitting: INTERNAL MEDICINE
Payer: COMMERCIAL

## 2019-05-03 ENCOUNTER — ONCOLOGY VISIT (OUTPATIENT)
Dept: ONCOLOGY | Facility: CLINIC | Age: 34
End: 2019-05-03
Attending: INTERNAL MEDICINE
Payer: COMMERCIAL

## 2019-05-03 VITALS
TEMPERATURE: 97.8 F | RESPIRATION RATE: 16 BRPM | WEIGHT: 157 LBS | OXYGEN SATURATION: 100 % | BODY MASS INDEX: 24.64 KG/M2 | DIASTOLIC BLOOD PRESSURE: 78 MMHG | HEART RATE: 71 BPM | SYSTOLIC BLOOD PRESSURE: 130 MMHG | HEIGHT: 67 IN

## 2019-05-03 DIAGNOSIS — D75.839 THROMBOCYTOSIS: Primary | ICD-10-CM

## 2019-05-03 LAB
BASOPHILS # BLD AUTO: 0.1 10E9/L (ref 0–0.2)
BASOPHILS NFR BLD AUTO: 1.3 %
DIFFERENTIAL METHOD BLD: ABNORMAL
EOSINOPHIL # BLD AUTO: 0.4 10E9/L (ref 0–0.7)
EOSINOPHIL NFR BLD AUTO: 4.9 %
ERYTHROCYTE [DISTWIDTH] IN BLOOD BY AUTOMATED COUNT: 12.5 % (ref 10–15)
HCT VFR BLD AUTO: 44.9 % (ref 40–53)
HGB BLD-MCNC: 14.8 G/DL (ref 13.3–17.7)
IMM GRANULOCYTES # BLD: 0.1 10E9/L (ref 0–0.4)
IMM GRANULOCYTES NFR BLD: 0.7 %
LYMPHOCYTES # BLD AUTO: 2.3 10E9/L (ref 0.8–5.3)
LYMPHOCYTES NFR BLD AUTO: 26.4 %
MCH RBC QN AUTO: 29.4 PG (ref 26.5–33)
MCHC RBC AUTO-ENTMCNC: 33 G/DL (ref 31.5–36.5)
MCV RBC AUTO: 89 FL (ref 78–100)
MONOCYTES # BLD AUTO: 0.8 10E9/L (ref 0–1.3)
MONOCYTES NFR BLD AUTO: 8.6 %
NEUTROPHILS # BLD AUTO: 5.1 10E9/L (ref 1.6–8.3)
NEUTROPHILS NFR BLD AUTO: 58.1 %
NRBC # BLD AUTO: 0 10*3/UL
NRBC BLD AUTO-RTO: 0 /100
PLATELET # BLD AUTO: 745 10E9/L (ref 150–450)
RBC # BLD AUTO: 5.03 10E12/L (ref 4.4–5.9)
WBC # BLD AUTO: 8.7 10E9/L (ref 4–11)

## 2019-05-03 PROCEDURE — 85025 COMPLETE CBC W/AUTO DIFF WBC: CPT | Performed by: INTERNAL MEDICINE

## 2019-05-03 PROCEDURE — 99215 OFFICE O/P EST HI 40 MIN: CPT | Performed by: INTERNAL MEDICINE

## 2019-05-03 PROCEDURE — G0463 HOSPITAL OUTPT CLINIC VISIT: HCPCS

## 2019-05-03 ASSESSMENT — MIFFLIN-ST. JEOR: SCORE: 1602.84

## 2019-05-03 ASSESSMENT — PAIN SCALES - GENERAL: PAINLEVEL: NO PAIN (0)

## 2019-05-03 NOTE — LETTER
"    5/3/2019         RE: Ismael Britton  [address]  [facility] [address]        Dear Colleague,    Thank you for referring your patient, Ismael Britton, to the [facility]. Please see a copy of my visit note below.    [facility]  HEMATOLOGY AND ONCOLOGY    FOLLOW-UP VISIT NOTE    PATIENT NAME: Ismael Britton MRN # [id number]  DATE OF VISIT: May 3, 2019 YOB: 1985       REFERRING PROVIDER: Leonard Murillo PA-C  [address] [place] [address]    DIAGNOSIS: Essential thrombocythemia - CALR [id number]*47    CURRENT INTERVENTIONS:  Observation    SUBJECTIVE   Ismael Britton is being followed for thrombocytosis     Ismael is seen for a follow up after completing work up for his thrombocytosis. He has no new complains.       PAST MEDICAL HISTORY     Past Medical History:   Diagnosis Date     Anxiety 9/2010     Asthma, mild intermittent          CURRENT OUTPATIENT MEDICATIONS     Current Outpatient Medications   Medication Sig     cetirizine (ZYRTEC) 10 MG tablet Take 1 tablet by mouth every evening.     fluticasone (FLONASE) 50 MCG/ACT nasal spray 2 sprays by Both Nostrils route daily.     No current facility-administered medications for this visit.         ALLERGIES    No Known Allergies     REVIEW OF SYSTEMS   As above in the HPI, o/w complete 12-point ROS was negative.     PHYSICAL EXAM   /78   Pulse 71   Temp 97.8  F (36.6  C) (Tympanic)   Resp 16   Ht 1.689 m (5' 6.5\")   Wt 71.2 kg (157 lb)   SpO2 100%   BMI 24.96 kg/m     GEN: NAD  HEENT: PERRL, EOMI, no icterus, injection or pallor. Oropharynx is clear.  LYMPHATICs: no cervical or supraclavicular lymphadenopathy; no other abn lymphadenopathy  PULMONARY: clear with good air entry bilaterally  CARDIOVASCULAR: regular, no murmurs, rubs, or gallops  GASTROINTESTINAL: soft, non-tender, non-distended, normal bowel sounds, no hepatosplenomegaly by percussion or palpation  MUSCULOSKELTAL: " warm, well perfused, no edema  NEURO: awake, alert and oriented to time place and person, cranial nerves intact - II - XII, no focal neurologic deficits  SKIN: no rashes     LABORATORY AND IMAGING STUDIES     Recent Labs   Lab Test 05/03/19  0845 04/12/19  1358 03/29/19  0825 03/26/19  0809 03/25/19  1029   WBC 8.7 10.3  --   --  9.2   HGB 14.8 15.7  --   --  15.0   * 799* 684* 697* 710*   MCV 89 88  --   --  87   NEUTROPHIL 58.1 60.7  --   --  57.5     Recent Labs   Lab Test 04/12/19  1358        TSH   Date Value Ref Range Status   09/02/2010 1.28 0.4 - 5.0 mU/L Final     Lab Results   Component Value Date    PATH  04/12/2019     Patient Name: MONALISA ARAMBULA  MR#: [id number]  Specimen #: [id number]  Collected: 4/12/2019  Received: 4/12/2019  Reported: 4/12/2019 16:18  Ordering Phy(s): JOHN ANDINO    For improved result formatting, select 'View Enhanced Report Format' under   Linked Documents section.    TEST(S):  Peripheral Smear Morphology    FINAL DIAGNOSIS:  Peripheral blood morphology:  - Thrombocytosis, moderate.    COMMENT:  Reactive and neoplastic conditions should be excluded.  Next Generation   Sequencing - Oncology - MPN including  JAK2, RADHA and MPL should be performed to exclude a myeloproliferative   neoplasm.  Correlation with clinical  findings is recommended.    Electronically signed out by:    Eren Haywood M.D.    CLINICAL HISTORY:  34 years old male.  Indication for peripheral blood morphology:   Thrombocytosis.    PERIPHERAL BLOOD DATA:    Patient Value (Reference Range >18 year old male)  10.3     WBC (4.0-11.0 x 10*9/L)  5.31     RBC (4.4-5.9 x 10*12/L)  15.7     HGB (13.3-17.7 g/dL)  46.7     HCT (40.0-53.0 %)  88       MCV (78-100fL)  29.6     MCH (26.5-33.0 pg)  33.6     MCHC (31.5-36.5 g/dL)  12.5     RDW (10.0-15.0 %)  799      PLT (150-450 x 10*9/L)  1.1       RETIC (0.5-2.0%)    PERIPHERAL BLOOD DIFFERENTIAL  (Reference ranges >18 year old)    Percent  60.7   Neutrophils, segmented and bands  24.8  Lymphocytes  8.9   Monocytes  3.7   Eosinophils  1.4   Basophils  0.5   Immature granulocytes    Absolute  6.2   Neutrophils, segmented and bands  (1.6 - 8.3 x 10*9/L)  2.5   Lymphocytes  (0.8 - 5.3 x 10*9/L)  0.9   Monocytes  (0 -1.3 x 10*9/L)  0.4   Eosinophils  (0 - 0.7 x 10*9/L)  0.1   Basophils  (0 - 0.2 x 10*9/L)    PERIPHERAL MORPHOLOGY:    ERYTHROCYTES: The hemoglobin is recorded as 15.7 g/dL.  The erythrocytes   are normocytic normochromic.  There  is no significant anisopoikilocytosis.  There are no changes of   hyposplenism.    LEUKOCYTES: The white blood cell count is recorded as 10,300.  The   neutrophils, eosinophils, basophils,  monocytes and lymphocytes show mature morphology.  No left shift,   basophilia, monocytosis, dysplastic changes  or blasts are seen.    PLATELETS: The platelet count is recorded as 799,000.  The platelets show   normal size and granulation.  (Dictated by Eren Haywood MD 4- @ 4:17PM).    CPT Codes:  A: 53635-RROE    TESTING LAB LOCATION:  [facility] [address] [place]  [id number] [phone]    COLLECTION SITE:  Client:  [facility]  Location:  [facility] (R)      PATH  04/12/2019     Patient Name: MONALISA ARAMBULA  MR#: [id number]  Specimen #: [id number]  Collected: 4/12/2019 13:58  Received: 4/15/2019 08:25  Reported: 4/23/2019 17:22  Ordering Phy(s): JOHN ANDINO    For improved result formatting, select 'View Enhanced Report Format' under   Linked Documents section.  _________________________________________    TEST(S) REQUESTED:  Myeloproliferative Expanded NGSO    SPECIMEN DESCRIPTION:  Blood    SIGNIFICANT RESULTS    ---------------------------------------------------------------------  Detected Alterations of Known or Potential Pathogenicity: CALR Q514Sss*47    Detected Alterations of Uncertain Significance: None    Genes with No Detected Alterations of the Amino Acid  Sequence: JAK2, MPL  ---------------------------------------------------------------------     INTERPRETATION OF RESULTS     ---------------------------------------------------------------------     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  Specifically the c.1154delinsCTTGTC, p.(Wet791Yetrk*47) mutation   (NM_[id number])     Mutatons in exon 9 of the CALR gene are detected in approximately 65-85%   of cases of JAK2 and MPL mutation  negative essential thrombocythemia (ET) and primary myelofibrosis (PMF)   [1-3].     Correlation with clinical information, histology and other diagnostic   tests is indicated.     References     1. Mari J, Aida CE, Tramaine EJ, et al. Somatic CALR mutations in   myeloproliferative neoplasms with  nonmutated JAK2. N Engl J Med. 2013;369(25):2391-405.     2. Jael BEAR, Gordo RL, Di T, et al. XTP421 mutations in   myeloproliferative and other myeloid  disorders: a study of 1182 patients. Blood. 2006;108(10):3472-6.     3. Pedro T, Ricky H, Sudhir AS, et al. Somatic mutations of   calreticulin in myeloproliferative  neoplasms. N Engl J Med. 2013;369(25):2379-90.     ---------------------------------------------------------------------    GENETIC ALTERATIONS    ---------------------------------------------------------------------  Detected Alterations of Known or Potential Pathogenicity  ---------------------------------------------------------------------  Gene: CALR  Alteration: P010Ukr*47  c.1154A>C  Type of Alteration: Complex indel - frameshift  Significance: Pathogenic  Therapeutic Implications*: None  Additional Information: COSMIC: N/A Allele Frequency: 0.0% dbSNP:   [id number]  [https://www.ncbi.nlm.nih.gov/projects/SNP/snp_ref.cgi?sj=vf9108366191]    ---------------------------------------------------------------------  Detected Alterations of Uncertain  Significance  ---------------------------------------------------------------------  None    Electronically Signed Out By:  KESHIA Whitfield    CPT Codes:  A: -FESMXV, MPNEXPNGSO    TESTING LAB LOCATION:  [facility]  D210 May...         ASSESSMENT AND PLAN   1. Essential thrombocythemia - CALR F670Snz*47 +ve; low risk disease  2. ECOG PS 0  3. No other medical comobridity    I had a lengthy discussion with Ismael, who comes alone at this clinic visit.  His platelet count was normal in 09/2010 at 300,000.  His platelet counts have been markedly elevated since March of this year and have been close to 700K.  We did get next-generation sequencing to identify mutations associated with myeloproliferative disorders.  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  This is seen in patients with essential thrombocythemia, which is a condition of elevated platelet counts.  This is usually in somatically acquired mutation, which means that it was not present at birth.  We do not know the exact reason for acquiring this mutation.  Most patients with this condition have a near-normal life span.  The majority of patients are over 50 years old.  Unfortunately, he is quite young and has acquired this mutation.  I am not sure how much it would affect his overall care.  This condition is associated with an increased risk of blood clotting, both in the venous and the arterial system.  This can lead to lower extremity deep vein thrombosis, i.e., blood clot in the leg, or pulmonary embolism, i.e., clots in the vessels of the lung.  Besides these, it is also associated with strokes, which are arterial blood clots.  When the platelet counts are very high, it can also cause problems with bleeding.  Most of the bleeding problems are more of a nuisance value.  Patients can have nosebleeds, blood in the urine or stools.        Aspirin is used to prevent the blood clots.  However, it is indicated in patients who  are at higher risk of blood clots.  He has a relatively low risk given his young age and lack of JAK2 mutation.  Initiate aspirin at this time.      Hydroxyurea can be used to lower the platelet counts and decrease all of these side effects.  Again, given that he has a relatively lower high-risk disease, we would not consider starting him on hydroxyurea at this time.  Hydroxyurea is an oral chemotherapeutic agent, which lowers the blood counts, including the white cells, red cells and platelets.  It will lower his platelet counts much more than his hemoglobin or his white cell count.  However, we do not need to start it at this time.      We would continue with serial followups over time.  I would like to see him in about 4 months' time with a CBC just prior to visit.     Over 45 min of direct face to face time spent with patient with more than 50% time spent in counseling and coordinating care.      Again, thank you for allowing me to participate in the care of your patient.        Sincerely,        Charlie Alejo MD

## 2019-05-03 NOTE — NURSING NOTE
"Oncology Rooming Note    May 3, 2019 8:50 AM   Ismael Britton is a 34 year old male who presents for:    Chief Complaint   Patient presents with     Oncology Clinic Visit     Thrombocytosis (H)      Initial Vitals: /78   Pulse 71   Temp 97.8  F (36.6  C) (Tympanic)   Resp 16   Ht 1.689 m (5' 6.5\")   Wt 71.2 kg (157 lb)   SpO2 100%   BMI 24.96 kg/m   Estimated body mass index is 24.96 kg/m  as calculated from the following:    Height as of this encounter: 1.689 m (5' 6.5\").    Weight as of this encounter: 71.2 kg (157 lb). Body surface area is 1.83 meters squared.  No Pain (0) Comment: Data Unavailable   No LMP for male patient.  Allergies reviewed: Yes  Medications reviewed: Yes    Medications: Medication refills not needed today.  Pharmacy name entered into [employer]: [facility] [address] [place], [place] [address] AT [place] [address]    Clinical concerns: follow up       Coco Riojas CMA              "

## 2019-05-06 ENCOUNTER — TRANSFERRED RECORDS (OUTPATIENT)
Dept: HEALTH INFORMATION MANAGEMENT | Facility: CLINIC | Age: 34
End: 2019-05-06

## 2019-05-06 NOTE — PROGRESS NOTES
"[facility]  HEMATOLOGY AND ONCOLOGY    FOLLOW-UP VISIT NOTE    PATIENT NAME: Ismael Britton MRN # [id number]  DATE OF VISIT: May 3, 2019 YOB: 1985       REFERRING PROVIDER: Leonard Murillo PA-C  [address] [place] [address]    DIAGNOSIS: Essential thrombocythemia - CALR [id number]*47    CURRENT INTERVENTIONS:  Observation    SUBJECTIVE   Ismael Britton is being followed for thrombocytosis     Ismael is seen for a follow up after completing work up for his thrombocytosis. He has no new complains.       PAST MEDICAL HISTORY     Past Medical History:   Diagnosis Date     Anxiety 9/2010     Asthma, mild intermittent          CURRENT OUTPATIENT MEDICATIONS     Current Outpatient Medications   Medication Sig     cetirizine (ZYRTEC) 10 MG tablet Take 1 tablet by mouth every evening.     fluticasone (FLONASE) 50 MCG/ACT nasal spray 2 sprays by Both Nostrils route daily.     No current facility-administered medications for this visit.         ALLERGIES    No Known Allergies     REVIEW OF SYSTEMS   As above in the HPI, o/w complete 12-point ROS was negative.     PHYSICAL EXAM   /78   Pulse 71   Temp 97.8  F (36.6  C) (Tympanic)   Resp 16   Ht 1.689 m (5' 6.5\")   Wt 71.2 kg (157 lb)   SpO2 100%   BMI 24.96 kg/m    GEN: NAD  HEENT: PERRL, EOMI, no icterus, injection or pallor. Oropharynx is clear.  LYMPHATICs: no cervical or supraclavicular lymphadenopathy; no other abn lymphadenopathy  PULMONARY: clear with good air entry bilaterally  CARDIOVASCULAR: regular, no murmurs, rubs, or gallops  GASTROINTESTINAL: soft, non-tender, non-distended, normal bowel sounds, no hepatosplenomegaly by percussion or palpation  MUSCULOSKELTAL: warm, well perfused, no edema  NEURO: awake, alert and oriented to time place and person, cranial nerves intact - II - XII, no focal neurologic deficits  SKIN: no rashes     LABORATORY AND IMAGING STUDIES     Recent Labs   Lab Test 05/03/19  0845 " 04/12/19  1358 03/29/19  0825 03/26/19  0809 03/25/19  1029   WBC 8.7 10.3  --   --  9.2   HGB 14.8 15.7  --   --  15.0   * 799* 684* 697* 710*   MCV 89 88  --   --  87   NEUTROPHIL 58.1 60.7  --   --  57.5     Recent Labs   Lab Test 04/12/19  1358        TSH   Date Value Ref Range Status   09/02/2010 1.28 0.4 - 5.0 mU/L Final     Lab Results   Component Value Date    PATH  04/12/2019     Patient Name: MONALISA ARAMBULA  MR#: [id number]  Specimen #: [id number]  Collected: 4/12/2019  Received: 4/12/2019  Reported: 4/12/2019 16:18  Ordering Phy(s): JOHN ANDINO    For improved result formatting, select 'View Enhanced Report Format' under   Linked Documents section.    TEST(S):  Peripheral Smear Morphology    FINAL DIAGNOSIS:  Peripheral blood morphology:  - Thrombocytosis, moderate.    COMMENT:  Reactive and neoplastic conditions should be excluded.  Next Generation   Sequencing - Oncology - MPN including  JAK2, RADHA and MPL should be performed to exclude a myeloproliferative   neoplasm.  Correlation with clinical  findings is recommended.    Electronically signed out by:    Eren Haywood M.D.    CLINICAL HISTORY:  34 years old male.  Indication for peripheral blood morphology:   Thrombocytosis.    PERIPHERAL BLOOD DATA:    Patient Value (Reference Range >18 year old male)  10.3     WBC (4.0-11.0 x 10*9/L)  5.31     RBC (4.4-5.9 x 10*12/L)  15.7     HGB (13.3-17.7 g/dL)  46.7     HCT (40.0-53.0 %)  88       MCV (78-100fL)  29.6     MCH (26.5-33.0 pg)  33.6     MCHC (31.5-36.5 g/dL)  12.5     RDW (10.0-15.0 %)  799      PLT (150-450 x 10*9/L)  1.1       RETIC (0.5-2.0%)    PERIPHERAL BLOOD DIFFERENTIAL  (Reference ranges >18 year old)    Percent  60.7  Neutrophils, segmented and bands  24.8  Lymphocytes  8.9   Monocytes  3.7   Eosinophils  1.4   Basophils  0.5   Immature granulocytes    Absolute  6.2   Neutrophils, segmented and bands  (1.6 - 8.3 x 10*9/L)  2.5   Lymphocytes  (0.8 - 5.3 x  10*9/L)  0.9   Monocytes  (0 -1.3 x 10*9/L)  0.4   Eosinophils  (0 - 0.7 x 10*9/L)  0.1   Basophils  (0 - 0.2 x 10*9/L)    PERIPHERAL MORPHOLOGY:    ERYTHROCYTES: The hemoglobin is recorded as 15.7 g/dL.  The erythrocytes   are normocytic normochromic.  There  is no significant anisopoikilocytosis.  There are no changes of   hyposplenism.    LEUKOCYTES: The white blood cell count is recorded as 10,300.  The   neutrophils, eosinophils, basophils,  monocytes and lymphocytes show mature morphology.  No left shift,   basophilia, monocytosis, dysplastic changes  or blasts are seen.    PLATELETS: The platelet count is recorded as 799,000.  The platelets show   normal size and granulation.  (Dictated by Eren Haywood MD 4- @ 4:17PM).    CPT Codes:  A: 22069-KDVH    TESTING LAB LOCATION:  [facility] [address] [place]  [id number] [phone]    COLLECTION SITE:  Client:  [facility]  Location:  [facility] (R)      PATH  04/12/2019     Patient Name: MONALISA ARAMBULA  MR#: [id number]  Specimen #: [id number]  Collected: 4/12/2019 13:58  Received: 4/15/2019 08:25  Reported: 4/23/2019 17:22  Ordering Phy(s): JOHN ANDINO    For improved result formatting, select 'View Enhanced Report Format' under   Linked Documents section.  _________________________________________    TEST(S) REQUESTED:  Myeloproliferative Expanded NGSO    SPECIMEN DESCRIPTION:  Blood    SIGNIFICANT RESULTS    ---------------------------------------------------------------------  Detected Alterations of Known or Potential Pathogenicity: CALR B030Ejm*47    Detected Alterations of Uncertain Significance: None    Genes with No Detected Alterations of the Amino Acid Sequence: JAK2, MPL  ---------------------------------------------------------------------     INTERPRETATION OF RESULTS     ---------------------------------------------------------------------     A pathogenic CALR frameshift mutation was  detected in exon 9 of CALR.  Specifically the c.1154delinsCTTGTC, p.(Zgq278Bwtoh*47) mutation   (NM_004343.3)     Mutatons in exon 9 of the CALR gene are detected in approximately 65-85%   of cases of JAK2 and MPL mutation  negative essential thrombocythemia (ET) and primary myelofibrosis (PMF)   [1-3].     Correlation with clinical information, histology and other diagnostic   tests is indicated.     References     1. Mari J, Aida CE, Tramaine EJ, et al. Somatic CALR mutations in   myeloproliferative neoplasms with  nonmutated JAK2. N Engl J Med. 2013;369(25):2391-405.     2. Jael AD, Gordo RL, Di T, et al. FNJ269 mutations in   myeloproliferative and other myeloid  disorders: a study of 1182 patients. Blood. 2006;108(10):3472-6.     3. Pedro T, Ricky H, Sudhir AS, et al. Somatic mutations of   calreticulin in myeloproliferative  neoplasms. N Engl J Med. 2013;369(25):2379-90.     ---------------------------------------------------------------------    GENETIC ALTERATIONS    ---------------------------------------------------------------------  Detected Alterations of Known or Potential Pathogenicity  ---------------------------------------------------------------------  Gene: CALR  Alteration: G238Jwv*47  c.1154A>C  Type of Alteration: Complex indel - frameshift  Significance: Pathogenic  Therapeutic Implications*: None  Additional Information: alive.cn: N/A Allele Frequency: 0.0% dbSNP:   [id number]  [https://www.ncbi.nlm.nih.gov/projects/SNP/snp_ref.cgi?ce=lq6719585088]    ---------------------------------------------------------------------  Detected Alterations of Uncertain Significance  ---------------------------------------------------------------------  None    Electronically Signed Out By:  KESHIA Whitfield    CPT Codes:  A: -JADKOA, MPNEXPNGSO    TESTING LAB LOCATION:  [place] [address] May...         ASSESSMENT AND PLAN    1. Essential thrombocythemia - CALR X115Pxp*47 +ve; low risk disease  2. ECOG PS 0  3. No other medical comobridity    I had a lengthy discussion with Ismael, who comes alone at this clinic visit.  His platelet count was normal in 09/2010 at 300,000.  His platelet counts have been markedly elevated since March of this year and have been close to 700K.  We did get next-generation sequencing to identify mutations associated with myeloproliferative disorders.  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  This is seen in patients with essential thrombocythemia, which is a condition of elevated platelet counts.  This is usually in somatically acquired mutation, which means that it was not present at birth.  We do not know the exact reason for acquiring this mutation.  Most patients with this condition have a near-normal life span.  The majority of patients are over 50 years old.  Unfortunately, he is quite young and has acquired this mutation.  I am not sure how much it would affect his overall care.  This condition is associated with an increased risk of blood clotting, both in the venous and the arterial system.  This can lead to lower extremity deep vein thrombosis, i.e., blood clot in the leg, or pulmonary embolism, i.e., clots in the vessels of the lung.  Besides these, it is also associated with strokes, which are arterial blood clots.  When the platelet counts are very high, it can also cause problems with bleeding.  Most of the bleeding problems are more of a nuisance value.  Patients can have nosebleeds, blood in the urine or stools.        Aspirin is used to prevent the blood clots.  However, it is indicated in patients who are at higher risk of blood clots.  He has a relatively low risk given his young age and lack of JAK2 mutation.  Initiate aspirin at this time.      Hydroxyurea can be used to lower the platelet counts and decrease all of these side effects.  Again, given that he has a relatively lower high-risk disease,  we would not consider starting him on hydroxyurea at this time.  Hydroxyurea is an oral chemotherapeutic agent, which lowers the blood counts, including the white cells, red cells and platelets.  It will lower his platelet counts much more than his hemoglobin or his white cell count.  However, we do not need to start it at this time.      We would continue with serial followups over time.  I would like to see him in about 4 months' time with a CBC just prior to visit.     Over 45 min of direct face to face time spent with patient with more than 50% time spent in counseling and coordinating care.

## 2019-09-06 DIAGNOSIS — D75.839 THROMBOCYTOSIS: Primary | ICD-10-CM

## 2019-09-09 ENCOUNTER — ONCOLOGY VISIT (OUTPATIENT)
Dept: ONCOLOGY | Facility: CLINIC | Age: 34
End: 2019-09-09
Attending: INTERNAL MEDICINE
Payer: COMMERCIAL

## 2019-09-09 ENCOUNTER — HOSPITAL ENCOUNTER (OUTPATIENT)
Facility: CLINIC | Age: 34
Setting detail: SPECIMEN
Discharge: HOME | End: 2019-09-09
Attending: INTERNAL MEDICINE | Admitting: INTERNAL MEDICINE
Payer: COMMERCIAL

## 2019-09-09 VITALS
HEIGHT: 67 IN | WEIGHT: 164.8 LBS | HEART RATE: 62 BPM | RESPIRATION RATE: 16 BRPM | OXYGEN SATURATION: 97 % | SYSTOLIC BLOOD PRESSURE: 118 MMHG | TEMPERATURE: 97.7 F | DIASTOLIC BLOOD PRESSURE: 68 MMHG | BODY MASS INDEX: 25.87 KG/M2

## 2019-09-09 DIAGNOSIS — D75.839 THROMBOCYTOSIS: Primary | ICD-10-CM

## 2019-09-09 LAB
BASOPHILS # BLD AUTO: 0.1 10E9/L (ref 0–0.2)
BASOPHILS NFR BLD AUTO: 1.1 %
DIFFERENTIAL METHOD BLD: ABNORMAL
EOSINOPHIL # BLD AUTO: 0.8 10E9/L (ref 0–0.7)
EOSINOPHIL NFR BLD AUTO: 7.6 %
ERYTHROCYTE [DISTWIDTH] IN BLOOD BY AUTOMATED COUNT: 12.5 % (ref 10–15)
HCT VFR BLD AUTO: 46.6 % (ref 40–53)
HGB BLD-MCNC: 15.4 G/DL (ref 13.3–17.7)
IMM GRANULOCYTES # BLD: 0 10E9/L (ref 0–0.4)
IMM GRANULOCYTES NFR BLD: 0.2 %
LYMPHOCYTES # BLD AUTO: 3.3 10E9/L (ref 0.8–5.3)
LYMPHOCYTES NFR BLD AUTO: 29.5 %
MCH RBC QN AUTO: 29.6 PG (ref 26.5–33)
MCHC RBC AUTO-ENTMCNC: 33 G/DL (ref 31.5–36.5)
MCV RBC AUTO: 90 FL (ref 78–100)
MONOCYTES # BLD AUTO: 1 10E9/L (ref 0–1.3)
MONOCYTES NFR BLD AUTO: 8.6 %
NEUTROPHILS # BLD AUTO: 5.9 10E9/L (ref 1.6–8.3)
NEUTROPHILS NFR BLD AUTO: 53 %
NRBC # BLD AUTO: 0 10*3/UL
NRBC BLD AUTO-RTO: 0 /100
PLATELET # BLD AUTO: 833 10E9/L (ref 150–450)
RBC # BLD AUTO: 5.2 10E12/L (ref 4.4–5.9)
WBC # BLD AUTO: 11.1 10E9/L (ref 4–11)

## 2019-09-09 PROCEDURE — 99214 OFFICE O/P EST MOD 30 MIN: CPT | Performed by: INTERNAL MEDICINE

## 2019-09-09 PROCEDURE — 85025 COMPLETE CBC W/AUTO DIFF WBC: CPT | Performed by: INTERNAL MEDICINE

## 2019-09-09 PROCEDURE — G0463 HOSPITAL OUTPT CLINIC VISIT: HCPCS

## 2019-09-09 ASSESSMENT — PAIN SCALES - GENERAL: PAINLEVEL: NO PAIN (0)

## 2019-09-09 ASSESSMENT — MIFFLIN-ST. JEOR: SCORE: 1638.22

## 2019-09-09 NOTE — PROGRESS NOTES
"[facility]  HEMATOLOGY AND ONCOLOGY    FOLLOW-UP VISIT NOTE    PATIENT NAME: Ismael Britton MRN # [id number]  DATE OF VISIT: Sep 9, 2019 YOB: 1985       REFERRING PROVIDER: Leonard Murillo PA-C  [address] [place] [address]    DIAGNOSIS: Essential thrombocythemia - CALR F631Eoq*47    CURRENT INTERVENTIONS:  Observation    SUBJECTIVE   Ismael Britton is being followed for thrombocytosis     Ismael is seen for a follow up after completing work up for his thrombocytosis. He has no new complains.       PAST MEDICAL HISTORY     Past Medical History:   Diagnosis Date     Anxiety 9/2010     Asthma, mild intermittent          CURRENT OUTPATIENT MEDICATIONS     Current Outpatient Medications   Medication Sig     cetirizine (ZYRTEC) 10 MG tablet Take 1 tablet by mouth every evening.     fluticasone (FLONASE) 50 MCG/ACT nasal spray 2 sprays by Both Nostrils route daily.     No current facility-administered medications for this visit.         ALLERGIES    No Known Allergies     REVIEW OF SYSTEMS   As above in the HPI, o/w complete 12-point ROS was negative.     PHYSICAL EXAM   /68   Pulse 62   Temp 97.7  F (36.5  C) (Oral)   Resp 16   Ht 1.689 m (5' 6.5\")   Wt 74.8 kg (164 lb 12.8 oz)   SpO2 97%   BMI 26.20 kg/m    GEN: NAD  HEENT: PERRL, EOMI, no icterus, injection or pallor. Oropharynx is clear.  LYMPHATICs: no cervical or supraclavicular lymphadenopathy; no other abn lymphadenopathy  PULMONARY: clear with good air entry bilaterally  CARDIOVASCULAR: regular, no murmurs, rubs, or gallops  GASTROINTESTINAL: soft, non-tender, non-distended, normal bowel sounds, no hepatosplenomegaly by percussion or palpation  MUSCULOSKELTAL: warm, well perfused, no edema  NEURO: awake, alert and oriented to time place and person, cranial nerves intact - II - XII, no focal neurologic deficits  SKIN: no rashes     LABORATORY AND IMAGING STUDIES     No results for input(s): NA, POTASSIUM, " CHLORIDE, CO2, ANIONGAP, BUN, CR, GLC, KAHLIL in the last 75863 hours.  No results for input(s): MAG, PHOS in the last 99158 hours.  Recent Labs   Lab Test 05/03/19  0845 04/12/19  1358 03/29/19  0825 03/26/19  0809 03/25/19  1029   WBC 8.7 10.3  --   --  9.2   HGB 14.8 15.7  --   --  15.0   * 799* 684* 697* 710*   MCV 89 88  --   --  87   NEUTROPHIL 58.1 60.7  --   --  57.5     Recent Labs   Lab Test 04/12/19  1358        TSH   Date Value Ref Range Status   09/02/2010 1.28 0.4 - 5.0 mU/L Final       Lab Results   Component Value Date    PATH  04/12/2019     Patient Name: MONALISA ARAMBULA  MR#: [id number]  Specimen #: [id number]  Collected: 4/12/2019  Received: 4/12/2019  Reported: 4/12/2019 16:18  Ordering Phy(s): JOHN ANDINO    For improved result formatting, select 'View Enhanced Report Format' under   Linked Documents section.    TEST(S):  Peripheral Smear Morphology    FINAL DIAGNOSIS:  Peripheral blood morphology:  - Thrombocytosis, moderate.    COMMENT:  Reactive and neoplastic conditions should be excluded.  Next Generation   Sequencing - Oncology - MPN including  JAK2, RADHA and MPL should be performed to exclude a myeloproliferative   neoplasm.  Correlation with clinical  findings is recommended.    Electronically signed out by:    Eren Haywood M.D.    CLINICAL HISTORY:  34 years old male.  Indication for peripheral blood morphology:   Thrombocytosis.    PERIPHERAL BLOOD DATA:    Patient Value (Reference Range >18 year old male)  10.3     WBC (4.0-11.0 x 10*9/L)  5.31     RBC (4.4-5.9 x 10*12/L)  15.7     HGB (13.3-17.7 g/dL)  46.7     HCT (40.0-53.0 %)  88       MCV (78-100fL)  29.6     MCH (26.5-33.0 pg)  33.6     MCHC (31.5-36.5 g/dL)  12.5     RDW (10.0-15.0 %)  799      PLT (150-450 x 10*9/L)  1.1       RETIC (0.5-2.0%)    PERIPHERAL BLOOD DIFFERENTIAL  (Reference ranges >18 year old)    Percent  60.7  Neutrophils, segmented and bands  24.8  Lymphocytes  8.9   Monocytes  3.7    Eosinophils  1.4   Basophils  0.5   Immature granulocytes    Absolute  6.2   Neutrophils, segmented and bands  (1.6 - 8.3 x 10*9/L)  2.5   Lymphocytes  (0.8 - 5.3 x 10*9/L)  0.9   Monocytes  (0 -1.3 x 10*9/L)  0.4   Eosinophils  (0 - 0.7 x 10*9/L)  0.1   Basophils  (0 - 0.2 x 10*9/L)    PERIPHERAL MORPHOLOGY:    ERYTHROCYTES: The hemoglobin is recorded as 15.7 g/dL.  The erythrocytes   are normocytic normochromic.  There  is no significant anisopoikilocytosis.  There are no changes of   hyposplenism.    LEUKOCYTES: The white blood cell count is recorded as 10,300.  The   neutrophils, eosinophils, basophils,  monocytes and lymphocytes show mature morphology.  No left shift,   basophilia, monocytosis, dysplastic changes  or blasts are seen.    PLATELETS: The platelet count is recorded as 799,000.  The platelets show   normal size and granulation.  (Dictated by Eren Haywood MD 4- @ 4:17PM).    CPT Codes:  A: 92796-WXJO    TESTING LAB LOCATION:  [facility] [address] [place]  [id number] [phone]    COLLECTION SITE:  Client:  [facility]  Location:  [facility] (R)      [facility]  04/12/2019     Patient Name: MONALISA ARAMBULA  MR#: [id number]  Specimen #: [id number]  Collected: 4/12/2019 13:58  Received: 4/15/2019 08:25  Reported: 4/23/2019 17:22  Ordering Phy(s): JOHN ANDINO    For improved result formatting, select 'View Enhanced Report Format' under   Linked Documents section.  _________________________________________    TEST(S) REQUESTED:  Myeloproliferative Expanded NGSO    SPECIMEN DESCRIPTION:  Blood    SIGNIFICANT RESULTS    ---------------------------------------------------------------------  Detected Alterations of Known or Potential Pathogenicity: CALR F523Uuk*47    Detected Alterations of Uncertain Significance: None    Genes with No Detected Alterations of the Amino Acid Sequence: JAK2,  MPL  ---------------------------------------------------------------------     INTERPRETATION OF RESULTS     ---------------------------------------------------------------------     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  Specifically the c.1154delinsCTTGTC, p.(Mwl656Wpihy*47) mutation   (NM_004343.3)     Mutatons in exon 9 of the CALR gene are detected in approximately 65-85%   of cases of JAK2 and MPL mutation  negative essential thrombocythemia (ET) and primary myelofibrosis (PMF)   [1-3].     Correlation with clinical information, histology and other diagnostic   tests is indicated.     References     1. Mari LAMAR, Aida CE, Tramaine EJ, et al. Somatic CALR mutations in   myeloproliferative neoplasms with  nonmutated JAK2. N Engl J Med. 2013;369(25):2391-405.     2. Jael BEAR, Gordo RL, Di T, et al. FXC680 mutations in   myeloproliferative and other myeloid  disorders: a study of 1182 patients. Blood. 2006;108(10):3472-6.     3. Pedro T, Ricky H, Sudhir AS, et al. Somatic mutations of   calreticulin in myeloproliferative  neoplasms. N Engl J Med. 2013;369(25):2379-90.     ---------------------------------------------------------------------    GENETIC ALTERATIONS    ---------------------------------------------------------------------  Detected Alterations of Known or Potential Pathogenicity  ---------------------------------------------------------------------  Gene: CALR  Alteration: G393Yej*47  c.1154A>C  Type of Alteration: Complex indel - frameshift  Significance: Pathogenic  Therapeutic Implications*: None  Additional Information: COSMIC: N/A Allele Frequency: 0.0% dbSNP:   [id number]  [https://www.ncbi.nlm.nih.gov/projects/SNP/snp_ref.cgi?cx=bu4275915605]    ---------------------------------------------------------------------  Detected Alterations of Uncertain  Significance  ---------------------------------------------------------------------  None    Electronically Signed Out By:  KESHIA WhitfieldPhysiciyanna    CPT Codes:  A: -HGDCOQ, MPNEXPNGSO    TESTING LAB LOCATION:  [facility]  [address]...         ASSESSMENT AND PLAN   1. Essential thrombocythemia - CALR Q456Ogl*47 +ve; low risk disease  2. ECOG PS 0  3. No other medical comobridity    I had a lengthy discussion with Ismael, who comes alone at this clinic visit.  His platelet count was normal in 09/2010 at 300,000.  His platelet counts have been markedly elevated since March of this year and have been close to 700K.  We did get next-generation sequencing to identify mutations associated with myeloproliferative disorders.  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  This is seen in patients with essential thrombocythemia, which is a condition of elevated platelet counts.  This is usually in somatically acquired mutation, which means that it was not present at birth.     His PLT counts had been pending at the time of visit. It has come back at 833. I will have him return in 3 months.    Aspirin is used to prevent the blood clots and we have him on ASA at low dose daily.       Hydroxyurea can be used to lower the platelet counts and decrease all of these side effects.  Again, given that he has a relatively lower high-risk disease, we would not consider starting him on hydroxyurea at this time.  Hydroxyurea is an oral chemotherapeutic agent, which lowers the blood counts, including the white cells, red cells and platelets.  It will lower his platelet counts much more than his hemoglobin or his white cell count.  We will continue to follow him closely and possibly might have to start him on hydroxyurea at our follow up visit.       We would continue with serial followups over time.  I would like to see him in about 3 months' time with a CBC just prior to visit.     Over 25 min of direct face to  face time spent with patient with more than 50% time spent in counseling and coordinating care.

## 2019-09-09 NOTE — LETTER
"    9/9/2019         RE: Ismael Britton  [address]  [facility] [address]        Dear Colleague,    Thank you for referring your patient, Ismael Britton, to the [facility]. Please see a copy of my visit note below.    [facility]  HEMATOLOGY AND ONCOLOGY    FOLLOW-UP VISIT NOTE    PATIENT NAME: Ismael Britton MRN # [id number]  DATE OF VISIT: Sep 9, 2019 YOB: 1985       REFERRING PROVIDER: Leonard Murillo PA-C  [address] [place] [address]    DIAGNOSIS: Essential thrombocythemia - CALR [id number]*47    CURRENT INTERVENTIONS:  Observation    SUBJECTIVE   Ismael Britton is being followed for thrombocytosis     Ismael is seen for a follow up after completing work up for his thrombocytosis. He has no new complains.       PAST MEDICAL HISTORY     Past Medical History:   Diagnosis Date     Anxiety 9/2010     Asthma, mild intermittent          CURRENT OUTPATIENT MEDICATIONS     Current Outpatient Medications   Medication Sig     cetirizine (ZYRTEC) 10 MG tablet Take 1 tablet by mouth every evening.     fluticasone (FLONASE) 50 MCG/ACT nasal spray 2 sprays by Both Nostrils route daily.     No current facility-administered medications for this visit.         ALLERGIES    No Known Allergies     REVIEW OF SYSTEMS   As above in the HPI, o/w complete 12-point ROS was negative.     PHYSICAL EXAM   /68   Pulse 62   Temp 97.7  F (36.5  C) (Oral)   Resp 16   Ht 1.689 m (5' 6.5\")   Wt 74.8 kg (164 lb 12.8 oz)   SpO2 97%   BMI 26.20 kg/m     GEN: NAD  HEENT: PERRL, EOMI, no icterus, injection or pallor. Oropharynx is clear.  LYMPHATICs: no cervical or supraclavicular lymphadenopathy; no other abn lymphadenopathy  PULMONARY: clear with good air entry bilaterally  CARDIOVASCULAR: regular, no murmurs, rubs, or gallops  GASTROINTESTINAL: soft, non-tender, non-distended, normal bowel sounds, no hepatosplenomegaly by percussion or palpation  MUSCULOSKELTAL: warm, well " perfused, no edema  NEURO: awake, alert and oriented to time place and person, cranial nerves intact - II - XII, no focal neurologic deficits  SKIN: no rashes     LABORATORY AND IMAGING STUDIES     No results for input(s): NA, POTASSIUM, CHLORIDE, CO2, ANIONGAP, BUN, CR, GLC, KAHLIL in the last 74702 hours.  No results for input(s): MAG, PHOS in the last 05666 hours.  Recent Labs   Lab Test 05/03/19  0845 04/12/19  1358 03/29/19  0825 03/26/19  0809 03/25/19  1029   WBC 8.7 10.3  --   --  9.2   HGB 14.8 15.7  --   --  15.0   * 799* 684* 697* 710*   MCV 89 88  --   --  87   NEUTROPHIL 58.1 60.7  --   --  57.5     Recent Labs   Lab Test 04/12/19  1358        TSH   Date Value Ref Range Status   09/02/2010 1.28 0.4 - 5.0 mU/L Final       Lab Results   Component Value Date    PATH  04/12/2019     Patient Name: MONALISA ARAMBULA  MR#: [id number]  Specimen #: [id number]  Collected: 4/12/2019  Received: 4/12/2019  Reported: 4/12/2019 16:18  Ordering Phy(s): JOHN ANDINO    For improved result formatting, select 'View Enhanced Report Format' under   Linked Documents section.    TEST(S):  Peripheral Smear Morphology    FINAL DIAGNOSIS:  Peripheral blood morphology:  - Thrombocytosis, moderate.    COMMENT:  Reactive and neoplastic conditions should be excluded.  Next Generation   Sequencing - Oncology - MPN including  JAK2, RADHA and MPL should be performed to exclude a myeloproliferative   neoplasm.  Correlation with clinical  findings is recommended.    Electronically signed out by:    Eren Haywood M.D.    CLINICAL HISTORY:  34 years old male.  Indication for peripheral blood morphology:   Thrombocytosis.    PERIPHERAL BLOOD DATA:    Patient Value (Reference Range >18 year old male)  10.3     WBC (4.0-11.0 x 10*9/L)  5.31     RBC (4.4-5.9 x 10*12/L)  15.7     HGB (13.3-17.7 g/dL)  46.7     HCT (40.0-53.0 %)  88       MCV (78-100fL)  29.6     MCH (26.5-33.0 pg)  33.6     MCHC (31.5-36.5 g/dL)  12.5     RDW  (10.0-15.0 %)  799      PLT (150-450 x 10*9/L)  1.1       RETIC (0.5-2.0%)    PERIPHERAL BLOOD DIFFERENTIAL  (Reference ranges >18 year old)    Percent  60.7  Neutrophils, segmented and bands  24.8  Lymphocytes  8.9   Monocytes  3.7   Eosinophils  1.4   Basophils  0.5   Immature granulocytes    Absolute  6.2   Neutrophils, segmented and bands  (1.6 - 8.3 x 10*9/L)  2.5   Lymphocytes  (0.8 - 5.3 x 10*9/L)  0.9   Monocytes  (0 -1.3 x 10*9/L)  0.4   Eosinophils  (0 - 0.7 x 10*9/L)  0.1   Basophils  (0 - 0.2 x 10*9/L)    PERIPHERAL MORPHOLOGY:    ERYTHROCYTES: The hemoglobin is recorded as 15.7 g/dL.  The erythrocytes   are normocytic normochromic.  There  is no significant anisopoikilocytosis.  There are no changes of   hyposplenism.    LEUKOCYTES: The white blood cell count is recorded as 10,300.  The   neutrophils, eosinophils, basophils,  monocytes and lymphocytes show mature morphology.  No left shift,   basophilia, monocytosis, dysplastic changes  or blasts are seen.    PLATELETS: The platelet count is recorded as 799,000.  The platelets show   normal size and granulation.  (Dictated by Eren Haywood MD 4- @ 4:17PM).    CPT Codes:  A: 66128-NPCS    TESTING LAB LOCATION:  [facility] [address] [place]  [address] [phone]    COLLECTION SITE:  Client:  [facility]  Location:  [facility] (R)      PATH  04/12/2019     Patient Name: MONALISA ARAMBULA  MR#: [id number]  Specimen #: [id number]  Collected: 4/12/2019 13:58  Received: 4/15/2019 08:25  Reported: 4/23/2019 17:22  Ordering Phy(s): JOHN ANDINO    For improved result formatting, select 'View Enhanced Report Format' under   Linked Documents section.  _________________________________________    TEST(S) REQUESTED:  Myeloproliferative Expanded NGSO    SPECIMEN DESCRIPTION:  Blood    SIGNIFICANT RESULTS    ---------------------------------------------------------------------  Detected Alterations of Known  or Potential Pathogenicity: CALR [id number]*47    Detected Alterations of Uncertain Significance: None    Genes with No Detected Alterations of the Amino Acid Sequence: JAK2, MPL  ---------------------------------------------------------------------     INTERPRETATION OF RESULTS     ---------------------------------------------------------------------     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  Specifically the c.1154delinsCTTGTC, p.(Uel902Dekef*47) mutation   (NM_004343.3)     Mutatons in exon 9 of the CALR gene are detected in approximately 65-85%   of cases of JAK2 and MPL mutation  negative essential thrombocythemia (ET) and primary myelofibrosis (PMF)   [1-3].     Correlation with clinical information, histology and other diagnostic   tests is indicated.     References     1. Mari J, Aida CE, Tramaine EJ, et al. Somatic CALR mutations in   myeloproliferative neoplasms with  nonmutated JAK2. N Engl J Med. 2013;369(25):2391-405.     2. Jael BEAR, Gordo RL, Di T, et al. ZBG078 mutations in   myeloproliferative and other myeloid  disorders: a study of 1182 patients. Blood. 2006;108(10):3472-6.     3. Pedro T, Ricky H, Sudhir AS, et al. Somatic mutations of   calreticulin in myeloproliferative  neoplasms. N Engl J Med. 2013;369(25):2379-90.     ---------------------------------------------------------------------    GENETIC ALTERATIONS    ---------------------------------------------------------------------  Detected Alterations of Known or Potential Pathogenicity  ---------------------------------------------------------------------  Gene: CALR  Alteration: [id number]*47  c.1154A>C  Type of Alteration: Complex indel - frameshift  Significance: Pathogenic  Therapeutic Implications*: None  Additional Information: COSMIC: N/A Allele Frequency: 0.0% dbSNP:    [id number]  [https://www.ncbi.nlm.nih.gov/projects/SNP/snp_ref.cgi?nk=bt4119601125]    ---------------------------------------------------------------------  Detected Alterations of Uncertain Significance  ---------------------------------------------------------------------  None    Electronically Signed Out By:  KESHIA Whitfield    CPT Codes:  A: -JXPAOM, MPNEXPNGSO    TESTING LAB LOCATION:  [place] [address] May...         ASSESSMENT AND PLAN   1. Essential thrombocythemia - CALR Q812Rhg*47 +ve; low risk disease  2. ECOG PS 0  3. No other medical comobridity    I had a lengthy discussion with Ismael, who comes alone at this clinic visit.  His platelet count was normal in 09/2010 at 300,000.  His platelet counts have been markedly elevated since March of this year and have been close to 700K.  We did get next-generation sequencing to identify mutations associated with myeloproliferative disorders.  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  This is seen in patients with essential thrombocythemia, which is a condition of elevated platelet counts.  This is usually in somatically acquired mutation, which means that it was not present at birth.     His PLT counts had been pending at the time of visit. It has come back at 833. I will have him return in 3 months.    Aspirin is used to prevent the blood clots and we have him on ASA at low dose daily.       Hydroxyurea can be used to lower the platelet counts and decrease all of these side effects.  Again, given that he has a relatively lower high-risk disease, we would not consider starting him on hydroxyurea at this time.  Hydroxyurea is an oral chemotherapeutic agent, which lowers the blood counts, including the white cells, red cells and platelets.  It will lower his platelet counts much more than his hemoglobin or his white cell count.  We will continue to follow him closely and possibly might have to start him on hydroxyurea at  our follow up visit.       We would continue with serial followups over time.  I would like to see him in about 3 months' time with a CBC just prior to visit.     Over 25 min of direct face to face time spent with patient with more than 50% time spent in counseling and coordinating care.      Again, thank you for allowing me to participate in the care of your patient.        Sincerely,        Charlie Alejo MD

## 2019-09-09 NOTE — NURSING NOTE
"Oncology Rooming Note    September 9, 2019 4:03 PM   Ismael Britton is a 34 year old male who presents for:    Chief Complaint   Patient presents with     Oncology Clinic Visit     Thrombocytosis     Initial Vitals: /68   Pulse 62   Temp 97.7  F (36.5  C) (Oral)   Resp 16   Ht 1.689 m (5' 6.5\")   Wt 74.8 kg (164 lb 12.8 oz)   SpO2 97%   BMI 26.20 kg/m   Estimated body mass index is 26.2 kg/m  as calculated from the following:    Height as of this encounter: 1.689 m (5' 6.5\").    Weight as of this encounter: 74.8 kg (164 lb 12.8 oz). Body surface area is 1.87 meters squared.  No Pain (0) Comment: Data Unavailable   No LMP for male patient.  Allergies reviewed: Yes  Medications reviewed: Yes    Medications: Medication refills not needed today.  Pharmacy name entered into WebVisible: [id number] [address] [facility] [place] [address]    Clinical concerns: f/u       Key Ware CMA              "

## 2019-11-08 ENCOUNTER — HEALTH MAINTENANCE LETTER (OUTPATIENT)
Age: 34
End: 2019-11-08

## 2019-12-09 ENCOUNTER — ONCOLOGY VISIT (OUTPATIENT)
Dept: ONCOLOGY | Facility: CLINIC | Age: 34
End: 2019-12-09
Attending: INTERNAL MEDICINE
Payer: COMMERCIAL

## 2019-12-09 ENCOUNTER — HOSPITAL ENCOUNTER (OUTPATIENT)
Facility: CLINIC | Age: 34
Setting detail: SPECIMEN
Discharge: HOME | End: 2019-12-09
Attending: INTERNAL MEDICINE | Admitting: INTERNAL MEDICINE
Payer: COMMERCIAL

## 2019-12-09 VITALS
BODY MASS INDEX: 25.15 KG/M2 | OXYGEN SATURATION: 96 % | TEMPERATURE: 98 F | HEART RATE: 69 BPM | SYSTOLIC BLOOD PRESSURE: 128 MMHG | RESPIRATION RATE: 16 BRPM | WEIGHT: 158.2 LBS | DIASTOLIC BLOOD PRESSURE: 70 MMHG

## 2019-12-09 DIAGNOSIS — D75.839 THROMBOCYTOSIS: ICD-10-CM

## 2019-12-09 DIAGNOSIS — D47.3 ESSENTIAL THROMBOCYTHEMIA (H): Primary | ICD-10-CM

## 2019-12-09 LAB
BASOPHILS # BLD AUTO: 0.1 10E9/L (ref 0–0.2)
BASOPHILS NFR BLD AUTO: 1 %
DIFFERENTIAL METHOD BLD: ABNORMAL
EOSINOPHIL # BLD AUTO: 0.6 10E9/L (ref 0–0.7)
EOSINOPHIL NFR BLD AUTO: 6.8 %
ERYTHROCYTE [DISTWIDTH] IN BLOOD BY AUTOMATED COUNT: 12.8 % (ref 10–15)
HCT VFR BLD AUTO: 45.6 % (ref 40–53)
HGB BLD-MCNC: 15.2 G/DL (ref 13.3–17.7)
IMM GRANULOCYTES # BLD: 0 10E9/L (ref 0–0.4)
IMM GRANULOCYTES NFR BLD: 0.5 %
LDH SERPL L TO P-CCNC: 178 U/L (ref 85–227)
LYMPHOCYTES # BLD AUTO: 2.4 10E9/L (ref 0.8–5.3)
LYMPHOCYTES NFR BLD AUTO: 27.8 %
MCH RBC QN AUTO: 29.3 PG (ref 26.5–33)
MCHC RBC AUTO-ENTMCNC: 33.3 G/DL (ref 31.5–36.5)
MCV RBC AUTO: 88 FL (ref 78–100)
MONOCYTES # BLD AUTO: 0.6 10E9/L (ref 0–1.3)
MONOCYTES NFR BLD AUTO: 6.8 %
NEUTROPHILS # BLD AUTO: 5 10E9/L (ref 1.6–8.3)
NEUTROPHILS NFR BLD AUTO: 57.1 %
NRBC # BLD AUTO: 0 10*3/UL
NRBC BLD AUTO-RTO: 0 /100
PLATELET # BLD AUTO: 927 10E9/L (ref 150–450)
RBC # BLD AUTO: 5.19 10E12/L (ref 4.4–5.9)
WBC # BLD AUTO: 8.7 10E9/L (ref 4–11)

## 2019-12-09 PROCEDURE — 99214 OFFICE O/P EST MOD 30 MIN: CPT | Performed by: INTERNAL MEDICINE

## 2019-12-09 PROCEDURE — 36415 COLL VENOUS BLD VENIPUNCTURE: CPT

## 2019-12-09 PROCEDURE — G0463 HOSPITAL OUTPT CLINIC VISIT: HCPCS

## 2019-12-09 PROCEDURE — 85025 COMPLETE CBC W/AUTO DIFF WBC: CPT | Performed by: INTERNAL MEDICINE

## 2019-12-09 PROCEDURE — 83615 LACTATE (LD) (LDH) ENZYME: CPT | Performed by: INTERNAL MEDICINE

## 2019-12-09 ASSESSMENT — PAIN SCALES - GENERAL: PAINLEVEL: NO PAIN (0)

## 2019-12-09 NOTE — NURSING NOTE
CBC:  Lab Results   Component Value Date    WBC 8.7 12/09/2019     Lab Results   Component Value Date    RBC 5.19 12/09/2019     Lab Results   Component Value Date    HGB 15.2 12/09/2019     Lab Results   Component Value Date    HCT 45.6 12/09/2019     Lab Results   Component Value Date    MCV 88 12/09/2019     Lab Results   Component Value Date    RDW 12.8 12/09/2019     Lab Results   Component Value Date     12/09/2019       Metabolic Panel:  Lab Results   Component Value Date     09/07/2010     Lab Results   Component Value Date    POTASSIUM 4.0 09/07/2010     Lab Results   Component Value Date    CHLORIDE 104 09/07/2010     Lab Results   Component Value Date    KAHLIL 9.5 09/07/2010     Lab Results   Component Value Date    CO2 28 09/07/2010     Lab Results   Component Value Date    BUN 12 09/07/2010     Lab Results   Component Value Date    CR 0.98 09/07/2010     Lab Results   Component Value Date    GLC 83 09/07/2010       LFT:  Lab Results   Component Value Date    ALT 48 09/02/2010     Lab Results   Component Value Date    AST 40 09/02/2010     Lab Results   Component Value Date    BILITOTAL 1.3 09/02/2010     No results found for: BILIDIRECT  Lab Results   Component Value Date    ALBUMIN 4.4 09/02/2010     Lab Results   Component Value Date    ALKPHOS 48 09/02/2010       Other Results:  No results found for: PSA  Lab Results   Component Value Date    TSH 1.28 09/02/2010     No results found for: MAG  No results found for: LACTDH  No results found for: CEA  No results found for:   No results found for: PM1957  No results found for:

## 2019-12-09 NOTE — LETTER
12/9/2019         RE: Ismael Britton  [address]  [facility] [address]        Dear Colleague,    Thank you for referring your patient, Ismael Britton, to the [facility]. Please see a copy of my visit note below.    [facility]  HEMATOLOGY AND ONCOLOGY    FOLLOW-UP VISIT NOTE    PATIENT NAME: Ismael Britton MRN # [id number]  DATE OF VISIT: Dec 9, 2019 YOB: 1985       REFERRING PROVIDER: Leonard Murillo PA-C  [address] [place] [address]    DIAGNOSIS: Essential thrombocythemia - CALR M278Bws*47    CURRENT INTERVENTIONS:  ASA    SUBJECTIVE   Ismael Britton is being followed for thrombocytosis     Ismael is seen for a follow up after completing work up for his thrombocytosis. He has no new complains.       PAST MEDICAL HISTORY     Past Medical History:   Diagnosis Date     Anxiety 9/2010     Asthma, mild intermittent          CURRENT OUTPATIENT MEDICATIONS     Current Outpatient Medications   Medication Sig     cetirizine (ZYRTEC) 10 MG tablet Take 1 tablet by mouth every evening.     fluticasone (FLONASE) 50 MCG/ACT nasal spray 2 sprays by Both Nostrils route daily.     No current facility-administered medications for this visit.         ALLERGIES    No Known Allergies     REVIEW OF SYSTEMS   As above in the HPI, o/w complete 12-point ROS was negative.     PHYSICAL EXAM   /70   Pulse 69   Temp 98  F (36.7  C) (Oral)   Resp 16   Wt 71.8 kg (158 lb 3.2 oz)   SpO2 96%   BMI 25.15 kg/m     GEN: NAD  HEENT: PERRL, EOMI, no icterus, injection or pallor. Oropharynx is clear.  LYMPHATICs: no cervical or supraclavicular lymphadenopathy; no other abn lymphadenopathy  PULMONARY: clear with good air entry bilaterally  CARDIOVASCULAR: regular, no murmurs, rubs, or gallops  GASTROINTESTINAL: soft, non-tender, non-distended, normal bowel sounds, no hepatosplenomegaly by percussion or palpation  MUSCULOSKELTAL: warm, well perfused, no edema  NEURO: awake,  alert and oriented to time place and person, cranial nerves intact - II - XII, no focal neurologic deficits  SKIN: no rashes     LABORATORY AND IMAGING STUDIES     Recent Labs   Lab Test 12/09/19  0900 09/09/19  1605 05/03/19  0845 04/12/19  1358 03/29/19  0825  03/25/19  1029   WBC 8.7 11.1* 8.7 10.3  --   --  9.2   HGB 15.2 15.4 14.8 15.7  --   --  15.0   * 833* 745* 799* 684*   < > 710*   MCV 88 90 89 88  --   --  87   NEUTROPHIL 57.1 53.0 58.1 60.7  --   --  57.5    < > = values in this interval not displayed.     Recent Labs   Lab Test 12/09/19  0900 04/12/19  1358    194     TSH   Date Value Ref Range Status   09/02/2010 1.28 0.4 - 5.0 mU/L Final       Lab Results   Component Value Date    PATH  04/12/2019     Patient Name: MONALISA ARAMBULA  MR#: [id number]  Specimen #: [id number]  Collected: 4/12/2019  Received: 4/12/2019  Reported: 4/12/2019 16:18  Ordering Phy(s): JOHN ANDINO    For improved result formatting, select 'View Enhanced Report Format' under   Linked Documents section.    TEST(S):  Peripheral Smear Morphology    FINAL DIAGNOSIS:  Peripheral blood morphology:  - Thrombocytosis, moderate.    COMMENT:  Reactive and neoplastic conditions should be excluded.  Next Generation   Sequencing - Oncology - MPN including  JAK2, RADHA and MPL should be performed to exclude a myeloproliferative   neoplasm.  Correlation with clinical  findings is recommended.    Electronically signed out by:    Eren Haywood M.D.    CLINICAL HISTORY:  34 years old male.  Indication for peripheral blood morphology:   Thrombocytosis.    PERIPHERAL BLOOD DATA:    Patient Value (Reference Range >18 year old male)  10.3     WBC (4.0-11.0 x 10*9/L)  5.31     RBC (4.4-5.9 x 10*12/L)  15.7     HGB (13.3-17.7 g/dL)  46.7     HCT (40.0-53.0 %)  88       MCV (78-100fL)  29.6     MCH (26.5-33.0 pg)  33.6     MCHC (31.5-36.5 g/dL)  12.5     RDW (10.0-15.0 %)  799      PLT (150-450 x 10*9/L)  1.1       RETIC  (0.5-2.0%)    PERIPHERAL BLOOD DIFFERENTIAL  (Reference ranges >18 year old)    Percent  60.7  Neutrophils, segmented and bands  24.8  Lymphocytes  8.9   Monocytes  3.7   Eosinophils  1.4   Basophils  0.5   Immature granulocytes    Absolute  6.2   Neutrophils, segmented and bands  (1.6 - 8.3 x 10*9/L)  2.5   Lymphocytes  (0.8 - 5.3 x 10*9/L)  0.9   Monocytes  (0 -1.3 x 10*9/L)  0.4   Eosinophils  (0 - 0.7 x 10*9/L)  0.1   Basophils  (0 - 0.2 x 10*9/L)    PERIPHERAL MORPHOLOGY:    ERYTHROCYTES: The hemoglobin is recorded as 15.7 g/dL.  The erythrocytes   are normocytic normochromic.  There  is no significant anisopoikilocytosis.  There are no changes of   hyposplenism.    LEUKOCYTES: The white blood cell count is recorded as 10,300.  The   neutrophils, eosinophils, basophils,  monocytes and lymphocytes show mature morphology.  No left shift,   basophilia, monocytosis, dysplastic changes  or blasts are seen.    PLATELETS: The platelet count is recorded as 799,000.  The platelets show   normal size and granulation.  (Dictated by Eren Haywood MD 4- @ 4:17PM).    CPT Codes:  A: 37732-YFFO    TESTING LAB LOCATION:  [facility] [address] [place]  [address]  [phone]    COLLECTION SITE:  Client:  [facility]  Location:  [facility] (R)      PATH  04/12/2019     Patient Name: MONALISA ARAMBULA  MR#: [id number]  Specimen #: [id number]  Collected: 4/12/2019 13:58  Received: 4/15/2019 08:25  Reported: 4/23/2019 17:22  Ordering Phy(s): JOHN ANDINO    For improved result formatting, select 'View Enhanced Report Format' under   Linked Documents section.  _________________________________________    TEST(S) REQUESTED:  Myeloproliferative Expanded NGSO    SPECIMEN DESCRIPTION:  Blood    SIGNIFICANT RESULTS    ---------------------------------------------------------------------  Detected Alterations of Known or Potential Pathogenicity: KATY I275Ynj*47    Detected  Alterations of Uncertain Significance: None    Genes with No Detected Alterations of the Amino Acid Sequence: JAK2, MPL  ---------------------------------------------------------------------     INTERPRETATION OF RESULTS     ---------------------------------------------------------------------     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  Specifically the c.1154delinsCTTGTC, p.(Ghy535Mfkqc*47) mutation   (NM_004343.3)     Mutatons in exon 9 of the CALR gene are detected in approximately 65-85%   of cases of JAK2 and MPL mutation  negative essential thrombocythemia (ET) and primary myelofibrosis (PMF)   [1-3].     Correlation with clinical information, histology and other diagnostic   tests is indicated.     References     1. Mari J, Aida CE, Tramaine EJ, et al. Somatic CALR mutations in   myeloproliferative neoplasms with  nonmutated JAK2. N Engl J Med. 2013;369(25):2391-405.     2. Jael BEAR, Gordo RL, Di T, et al. QPY752 mutations in   myeloproliferative and other myeloid  disorders: a study of 1182 patients. Blood. 2006;108(10):3472-6.     3. Pedro T, Ricky H, Sudhir AS, et al. Somatic mutations of   calreticulin in myeloproliferative  neoplasms. N Engl J Med. 2013;369(25):2379-90.     ---------------------------------------------------------------------    GENETIC ALTERATIONS    ---------------------------------------------------------------------  Detected Alterations of Known or Potential Pathogenicity  ---------------------------------------------------------------------  Gene: CALR  Alteration: B013Wvx*47  c.1154A>C  Type of Alteration: Complex indel - frameshift  Significance: Pathogenic  Therapeutic Implications*: None  Additional Information: COSMIC: N/A Allele Frequency: 0.0% dbSNP:   [id number]  [https://www.ncbi.nlm.nih.gov/projects/SNP/snp_ref.cgi?cg=vp4896885979]    ---------------------------------------------------------------------  Detected  Alterations of Uncertain Significance  ---------------------------------------------------------------------  None    Electronically Signed Out By:  KESHIA Whitfieldsiluis manuel    CPT Codes:  A: -MCJCSE, MPNEXPNGSO    TESTING LAB LOCATION:  [facility]  [address]...         ASSESSMENT AND PLAN   1. Essential thrombocythemia - CALR Y881Lwy*47 +ve; low risk disease  2. ECOG PS 0  3. No other medical comobridity    I had a lengthy discussion with Ismael, who comes alone at this clinic visit.  His platelet count was normal in 09/2010 at 300,000.  His platelet counts have been markedly elevated since March of this year and have been close to 700K. Unfortunately they seem to be rising and currently are elevated at 927k. We did get next-generation sequencing to identify mutations associated with myeloproliferative disorders.  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  This is seen in patients with essential thrombocythemia, which is a condition of elevated platelet counts.  This is usually in somatically acquired mutation, which means that it was not present at birth.     Aspirin is used to prevent the blood clots and we have him on ASA at low dose daily.       Hydroxyurea can be used to lower the platelet counts and decrease all of these side effects.  Again, given that he has a relatively lower high-risk disease, we would not consider starting him on hydroxyurea at this time.  Hydroxyurea is an oral chemotherapeutic agent, which lowers the blood counts, including the white cells, red cells and platelets.  It will lower his platelet counts much more than his hemoglobin or his white cell count. I provided him printed information from [employer] website and also medlineplus.     I explained him that if his PLT rise above 1 million there is a risk for acquired von Willebrand disease. Currently he seems to have low risk disease for CAD, CVA or venous thrombosis. Therapy is not indicated at  this time.       We will continue to follow him closely and possibly might have to start him on hydroxyurea at our follow up visit in 3 months.       All his questions were answered to his satisfaction and provided him with printed information on hydroxyurea and essential thrombocythemia from different web sources.     Over 25 min of direct face to face time spent with patient with more than 50% time spent in counseling and coordinating care.      Again, thank you for allowing me to participate in the care of your patient.        Sincerely,        Charlie Alejo MD

## 2019-12-09 NOTE — PROGRESS NOTES
[facility]  HEMATOLOGY AND ONCOLOGY    FOLLOW-UP VISIT NOTE    PATIENT NAME: Ismael Britton MRN # [id number]  DATE OF VISIT: Dec 9, 2019 YOB: 1985       REFERRING PROVIDER: Leonard Murillo PA-C  [address] [place] [address]    DIAGNOSIS: Essential thrombocythemia - CALR A860Nmh*47    CURRENT INTERVENTIONS:  ASA    SUBJECTIVE   Ismael Britton is being followed for thrombocytosis     Ismael is seen for a follow up after completing work up for his thrombocytosis. He has no new complains.       PAST MEDICAL HISTORY     Past Medical History:   Diagnosis Date     Anxiety 9/2010     Asthma, mild intermittent          CURRENT OUTPATIENT MEDICATIONS     Current Outpatient Medications   Medication Sig     cetirizine (ZYRTEC) 10 MG tablet Take 1 tablet by mouth every evening.     fluticasone (FLONASE) 50 MCG/ACT nasal spray 2 sprays by Both Nostrils route daily.     No current facility-administered medications for this visit.         ALLERGIES    No Known Allergies     REVIEW OF SYSTEMS   As above in the HPI, o/w complete 12-point ROS was negative.     PHYSICAL EXAM   /70   Pulse 69   Temp 98  F (36.7  C) (Oral)   Resp 16   Wt 71.8 kg (158 lb 3.2 oz)   SpO2 96%   BMI 25.15 kg/m    GEN: NAD  HEENT: PERRL, EOMI, no icterus, injection or pallor. Oropharynx is clear.  LYMPHATICs: no cervical or supraclavicular lymphadenopathy; no other abn lymphadenopathy  PULMONARY: clear with good air entry bilaterally  CARDIOVASCULAR: regular, no murmurs, rubs, or gallops  GASTROINTESTINAL: soft, non-tender, non-distended, normal bowel sounds, no hepatosplenomegaly by percussion or palpation  MUSCULOSKELTAL: warm, well perfused, no edema  NEURO: awake, alert and oriented to time place and person, cranial nerves intact - II - XII, no focal neurologic deficits  SKIN: no rashes     LABORATORY AND IMAGING STUDIES     Recent Labs   Lab Test 12/09/19  0900 09/09/19  1605 05/03/19  0887  04/12/19  1358 03/29/19  0825  03/25/19  1029   WBC 8.7 11.1* 8.7 10.3  --   --  9.2   HGB 15.2 15.4 14.8 15.7  --   --  15.0   * 833* 745* 799* 684*   < > 710*   MCV 88 90 89 88  --   --  87   NEUTROPHIL 57.1 53.0 58.1 60.7  --   --  57.5    < > = values in this interval not displayed.     Recent Labs   Lab Test 12/09/19  0900 04/12/19  1358    194     TSH   Date Value Ref Range Status   09/02/2010 1.28 0.4 - 5.0 mU/L Final       Lab Results   Component Value Date    PATH  04/12/2019     Patient Name: MONALISA ARAMBULA  MR#: [id number]  Specimen #: [id number]  Collected: 4/12/2019  Received: 4/12/2019  Reported: 4/12/2019 16:18  Ordering Phy(s): JOHN ANDINO    For improved result formatting, select 'View Enhanced Report Format' under   Linked Documents section.    TEST(S):  Peripheral Smear Morphology    FINAL DIAGNOSIS:  Peripheral blood morphology:  - Thrombocytosis, moderate.    COMMENT:  Reactive and neoplastic conditions should be excluded.  Next Generation   Sequencing - Oncology - MPN including  JAK2, RADHA and MPL should be performed to exclude a myeloproliferative   neoplasm.  Correlation with clinical  findings is recommended.    Electronically signed out by:    Eren Haywood M.D.    CLINICAL HISTORY:  34 years old male.  Indication for peripheral blood morphology:   Thrombocytosis.    PERIPHERAL BLOOD DATA:    Patient Value (Reference Range >18 year old male)  10.3     WBC (4.0-11.0 x 10*9/L)  5.31     RBC (4.4-5.9 x 10*12/L)  15.7     HGB (13.3-17.7 g/dL)  46.7     HCT (40.0-53.0 %)  88       MCV (78-100fL)  29.6     MCH (26.5-33.0 pg)  33.6     MCHC (31.5-36.5 g/dL)  12.5     RDW (10.0-15.0 %)  799      PLT (150-450 x 10*9/L)  1.1       RETIC (0.5-2.0%)    PERIPHERAL BLOOD DIFFERENTIAL  (Reference ranges >18 year old)    Percent  60.7  Neutrophils, segmented and bands  24.8  Lymphocytes  8.9   Monocytes  3.7   Eosinophils  1.4   Basophils  0.5   Immature  granulocytes    Absolute  6.2   Neutrophils, segmented and bands  (1.6 - 8.3 x 10*9/L)  2.5   Lymphocytes  (0.8 - 5.3 x 10*9/L)  0.9   Monocytes  (0 -1.3 x 10*9/L)  0.4   Eosinophils  (0 - 0.7 x 10*9/L)  0.1   Basophils  (0 - 0.2 x 10*9/L)    PERIPHERAL MORPHOLOGY:    ERYTHROCYTES: The hemoglobin is recorded as 15.7 g/dL.  The erythrocytes   are normocytic normochromic.  There  is no significant anisopoikilocytosis.  There are no changes of   hyposplenism.    LEUKOCYTES: The white blood cell count is recorded as 10,300.  The   neutrophils, eosinophils, basophils,  monocytes and lymphocytes show mature morphology.  No left shift,   basophilia, monocytosis, dysplastic changes  or blasts are seen.    PLATELETS: The platelet count is recorded as 799,000.  The platelets show   normal size and granulation.  (Dictated by Eren Haywood MD 4- @ 4:17PM).    CPT Codes:  A: 35084-IAJK    TESTING LAB LOCATION:  [facility] [address] [place]  [address]  [phone]    COLLECTION SITE:  Client:  [facility]  Location:  [facility] (R)      PATH  04/12/2019     Patient Name: MONALISA ARAMBULA  MR#: [id number]  Specimen #: [id number]  Collected: 4/12/2019 13:58  Received: 4/15/2019 08:25  Reported: 4/23/2019 17:22  Ordering Phy(s): JOHN ANDINO    For improved result formatting, select 'View Enhanced Report Format' under   Linked Documents section.  _________________________________________    TEST(S) REQUESTED:  Myeloproliferative Expanded NGSO    SPECIMEN DESCRIPTION:  Blood    SIGNIFICANT RESULTS    ---------------------------------------------------------------------  Detected Alterations of Known or Potential Pathogenicity: CALR F974Pmi*47    Detected Alterations of Uncertain Significance: None    Genes with No Detected Alterations of the Amino Acid Sequence: JAK2, MPL  ---------------------------------------------------------------------     INTERPRETATION OF  RESULTS     ---------------------------------------------------------------------     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  Specifically the c.1154delinsCTTGTC, p.(Adb424Ricrg*47) mutation   (NM_004343.3)     Mutatons in exon 9 of the CALR gene are detected in approximately 65-85%   of cases of JAK2 and MPL mutation  negative essential thrombocythemia (ET) and primary myelofibrosis (PMF)   [1-3].     Correlation with clinical information, histology and other diagnostic   tests is indicated.     References     1. Mari LAMAR, Aida SANCHEZ, Tramaine EJ, et al. Somatic CALR mutations in   myeloproliferative neoplasms with  nonmutated JAK2. N Engl J Med. 2013;369(25):2391-405.     2. Jael BEAR, Gordo RL, Di T, et al. EIQ011 mutations in   myeloproliferative and other myeloid  disorders: a study of 1182 patients. Blood. 2006;108(10):3472-6.     3. Pedro T, Ricky H, Sudhir AS, et al. Somatic mutations of   calreticulin in myeloproliferative  neoplasms. N Engl J Med. 2013;369(25):2379-90.     ---------------------------------------------------------------------    GENETIC ALTERATIONS    ---------------------------------------------------------------------  Detected Alterations of Known or Potential Pathogenicity  ---------------------------------------------------------------------  Gene: CALR  Alteration: H697Fac*47  c.1154A>C  Type of Alteration: Complex indel - frameshift  Significance: Pathogenic  Therapeutic Implications*: None  Additional Information: COSMIC: N/A Allele Frequency: 0.0% dbSNP:   [id number]  [https://www.ncbi.nlm.nih.gov/projects/SNP/snp_ref.cgi?ru=wp8563835371]    ---------------------------------------------------------------------  Detected Alterations of Uncertain Significance  ---------------------------------------------------------------------  None    Electronically Signed Out By:  KESHIA Whitfield CPT Codes:  A: -JIJFNI,  MPNEXPNGSO    TESTING LAB LOCATION:  [facility]  [address]...         ASSESSMENT AND PLAN   1. Essential thrombocythemia - CALR N780Rbr*47 +ve; low risk disease  2. ECOG PS 0  3. No other medical comobridity    I had a lengthy discussion with Ismael, who comes alone at this clinic visit.  His platelet count was normal in 09/2010 at 300,000.  His platelet counts have been markedly elevated since March of this year and have been close to 700K. Unfortunately they seem to be rising and currently are elevated at 927k. We did get next-generation sequencing to identify mutations associated with myeloproliferative disorders.  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  This is seen in patients with essential thrombocythemia, which is a condition of elevated platelet counts.  This is usually in somatically acquired mutation, which means that it was not present at birth.     Aspirin is used to prevent the blood clots and we have him on ASA at low dose daily.       Hydroxyurea can be used to lower the platelet counts and decrease all of these side effects.  Again, given that he has a relatively lower high-risk disease, we would not consider starting him on hydroxyurea at this time.  Hydroxyurea is an oral chemotherapeutic agent, which lowers the blood counts, including the white cells, red cells and platelets.  It will lower his platelet counts much more than his hemoglobin or his white cell count. I provided him printed information from [facility] website and also medlineplus.     I explained him that if his PLT rise above 1 million there is a risk for acquired von Willebrand disease. Currently he seems to have low risk disease for CAD, CVA or venous thrombosis. Therapy is not indicated at this time.       We will continue to follow him closely and possibly might have to start him on hydroxyurea at our follow up visit in 3 months.       All his questions were answered to his satisfaction and provided  him with printed information on hydroxyurea and essential thrombocythemia from different web sources.     Over 25 min of direct face to face time spent with patient with more than 50% time spent in counseling and coordinating care.

## 2019-12-09 NOTE — NURSING NOTE
"Oncology Rooming Note    December 9, 2019 9:13 AM   Ismael Britton is a 34 year old male who presents for:    Chief Complaint   Patient presents with     Oncology Clinic Visit     Thrombocytosis     Initial Vitals: /70   Pulse 69   Temp 98  F (36.7  C) (Oral)   Resp 16   Wt 71.8 kg (158 lb 3.2 oz)   SpO2 96%   BMI 25.15 kg/m   Estimated body mass index is 25.15 kg/m  as calculated from the following:    Height as of 9/9/19: 1.689 m (5' 6.5\").    Weight as of this encounter: 71.8 kg (158 lb 3.2 oz). Body surface area is 1.84 meters squared.  No Pain (0) Comment: Data Unavailable   No LMP for male patient.  Allergies reviewed: Yes  Medications reviewed: Yes    Medications: Medication refills not needed today.  Pharmacy name entered into [facility]: [id number] [address] AT [place] [address]    Clinical concerns:  Doctor was notified.      Barb Santiago CMA              "

## 2020-03-03 ENCOUNTER — ONCOLOGY VISIT (OUTPATIENT)
Dept: ONCOLOGY | Facility: CLINIC | Age: 35
End: 2020-03-03
Attending: INTERNAL MEDICINE
Payer: COMMERCIAL

## 2020-03-03 ENCOUNTER — PATIENT OUTREACH (OUTPATIENT)
Dept: ONCOLOGY | Facility: CLINIC | Age: 35
End: 2020-03-03

## 2020-03-03 ENCOUNTER — HOSPITAL ENCOUNTER (OUTPATIENT)
Facility: CLINIC | Age: 35
Setting detail: SPECIMEN
Discharge: HOME | End: 2020-03-03
Attending: INTERNAL MEDICINE | Admitting: INTERNAL MEDICINE
Payer: COMMERCIAL

## 2020-03-03 DIAGNOSIS — D75.839 THROMBOCYTOSIS: ICD-10-CM

## 2020-03-03 DIAGNOSIS — D47.3 ESSENTIAL THROMBOCYTHEMIA (H): ICD-10-CM

## 2020-03-03 LAB
BASOPHILS # BLD AUTO: 0.1 10E9/L (ref 0–0.2)
BASOPHILS NFR BLD AUTO: 1.1 %
DIFFERENTIAL METHOD BLD: ABNORMAL
EOSINOPHIL # BLD AUTO: 0.5 10E9/L (ref 0–0.7)
EOSINOPHIL NFR BLD AUTO: 5.8 %
ERYTHROCYTE [DISTWIDTH] IN BLOOD BY AUTOMATED COUNT: 12.9 % (ref 10–15)
HCT VFR BLD AUTO: 47.4 % (ref 40–53)
HGB BLD-MCNC: 16 G/DL (ref 13.3–17.7)
IMM GRANULOCYTES # BLD: 0 10E9/L (ref 0–0.4)
IMM GRANULOCYTES NFR BLD: 0.1 %
LDH SERPL L TO P-CCNC: 177 U/L (ref 85–227)
LYMPHOCYTES # BLD AUTO: 2.3 10E9/L (ref 0.8–5.3)
LYMPHOCYTES NFR BLD AUTO: 25.7 %
MCH RBC QN AUTO: 29.4 PG (ref 26.5–33)
MCHC RBC AUTO-ENTMCNC: 33.8 G/DL (ref 31.5–36.5)
MCV RBC AUTO: 87 FL (ref 78–100)
MONOCYTES # BLD AUTO: 0.7 10E9/L (ref 0–1.3)
MONOCYTES NFR BLD AUTO: 7.6 %
NEUTROPHILS # BLD AUTO: 5.3 10E9/L (ref 1.6–8.3)
NEUTROPHILS NFR BLD AUTO: 59.7 %
NRBC # BLD AUTO: 0 10*3/UL
NRBC BLD AUTO-RTO: 0 /100
PLATELET # BLD AUTO: 1080 10E9/L (ref 150–450)
PLATELET # BLD EST: ABNORMAL 10*3/UL
RBC # BLD AUTO: 5.44 10E12/L (ref 4.4–5.9)
RBC MORPH BLD: ABNORMAL
WBC # BLD AUTO: 8.9 10E9/L (ref 4–11)

## 2020-03-03 PROCEDURE — 00000167 ZZHCL STATISTIC INR NC: Performed by: INTERNAL MEDICINE

## 2020-03-03 PROCEDURE — 83615 LACTATE (LD) (LDH) ENZYME: CPT | Performed by: INTERNAL MEDICINE

## 2020-03-03 PROCEDURE — 85240 CLOT FACTOR VIII AHG 1 STAGE: CPT | Performed by: INTERNAL MEDICINE

## 2020-03-03 PROCEDURE — 36415 COLL VENOUS BLD VENIPUNCTURE: CPT

## 2020-03-03 PROCEDURE — 85245 CLOT FACTOR VIII VW RISTOCTN: CPT | Performed by: INTERNAL MEDICINE

## 2020-03-03 PROCEDURE — 00000447 ZZHCL STATISTIC VON WILLEBRAND MULTIMERS: Performed by: INTERNAL MEDICINE

## 2020-03-03 PROCEDURE — 00000328 ZZHCL STATISTIC PTT NC: Performed by: INTERNAL MEDICINE

## 2020-03-03 PROCEDURE — 85247 CLOT FACTOR VIII MULTIMETRIC: CPT | Performed by: INTERNAL MEDICINE

## 2020-03-03 PROCEDURE — 85246 CLOT FACTOR VIII VW ANTIGEN: CPT | Performed by: INTERNAL MEDICINE

## 2020-03-03 PROCEDURE — 85025 COMPLETE CBC W/AUTO DIFF WBC: CPT | Performed by: INTERNAL MEDICINE

## 2020-03-03 PROCEDURE — 00000401 ZZHCL STATISTIC THROMBIN TIME NC: Performed by: INTERNAL MEDICINE

## 2020-03-03 NOTE — NURSING NOTE
Medical Assistant Note:  Ismael Britton presents today for blood draw.    Patient seen by provider today: No.   present during visit today: Not Applicable.    Concerns: No Concerns.    Procedure:  Labs drawn    Post Assessment:  Labs drawn without difficulty: Yes.    Discharge Plan:  Departure Mode: Ambulatory.    Face to Face Time: 10 min.    Katie Rivera CMA

## 2020-03-03 NOTE — LETTER
3/3/2020         RE: Ismael Britton  [address] [facility] [address]        Dear Colleague,    Thank you for referring your patient, Ismael Britton, to the [facility]. Please see a copy of my visit note below.    See nursing note.    Katie Rivera CMA on 3/3/2020 at 9:28 AM      Again, thank you for allowing me to participate in the care of your patient.        Sincerely,         PAIGE Butler

## 2020-03-03 NOTE — PROGRESS NOTES
S-(situation): RNCC received critical lab report for platelets at 1080.    B-(background): Thrombocytothemia    A-(assessment): RNCC reported critical lab to Dr. Alejo.     R-(recommendations): Dr. Alejo recommends follow-up as scheduled. No further intervention recommended.    Akilah Hall, BSN, RN, OCN  RN Cancer Care Coordinator  [facility]  [phone]

## 2020-03-06 LAB
FACT VIII ACT/NOR PPP: 87 % (ref 55–200)
VWF CBA/VWF AG PPP IA-RTO: 99 % (ref 50–200)
VWF MULTIMERS PPP QL: NORMAL
VWF:AC ACT/NOR PPP IA: 71 % (ref 50–180)

## 2020-03-08 LAB — VWF:RCO ACT/NOR PPP PL AGG: 56 % (ref 51–215)

## 2020-03-10 LAB — VWF MULTIMERS PPP QL: NORMAL

## 2020-03-11 ENCOUNTER — ONCOLOGY VISIT (OUTPATIENT)
Dept: ONCOLOGY | Facility: CLINIC | Age: 35
End: 2020-03-11
Attending: INTERNAL MEDICINE
Payer: COMMERCIAL

## 2020-03-11 VITALS
BODY MASS INDEX: 25.6 KG/M2 | WEIGHT: 161 LBS | SYSTOLIC BLOOD PRESSURE: 129 MMHG | HEART RATE: 63 BPM | RESPIRATION RATE: 16 BRPM | OXYGEN SATURATION: 99 % | TEMPERATURE: 97.9 F | DIASTOLIC BLOOD PRESSURE: 71 MMHG

## 2020-03-11 DIAGNOSIS — D47.3 ESSENTIAL THROMBOCYTHEMIA (H): Primary | ICD-10-CM

## 2020-03-11 LAB — VON WILLEBRAND INTERPRETATION: NORMAL

## 2020-03-11 PROCEDURE — G0463 HOSPITAL OUTPT CLINIC VISIT: HCPCS

## 2020-03-11 PROCEDURE — 99213 OFFICE O/P EST LOW 20 MIN: CPT | Performed by: INTERNAL MEDICINE

## 2020-03-11 ASSESSMENT — PAIN SCALES - GENERAL: PAINLEVEL: NO PAIN (0)

## 2020-03-11 NOTE — NURSING NOTE
"Oncology Rooming Note    March 11, 2020 11:08 AM   Ismael Britton is a 35 year old male who presents for:    Chief Complaint   Patient presents with     Oncology Clinic Visit     Essential thrombocythemia     Initial Vitals: /71   Pulse 63   Temp 97.9  F (36.6  C) (Oral)   Resp 16   Wt 73 kg (161 lb)   SpO2 99%   BMI 25.60 kg/m   Estimated body mass index is 25.6 kg/m  as calculated from the following:    Height as of 9/9/19: 1.689 m (5' 6.5\").    Weight as of this encounter: 73 kg (161 lb). Body surface area is 1.85 meters squared.  No Pain (0) Comment: Data Unavailable   No LMP for male patient.  Allergies reviewed: Yes  Medications reviewed: Yes    Medications: Medication refills not needed today.  Pharmacy name entered into [employer]: [id number] [address] [facility]    Clinical concerns: f/u       Key Ware CMA              "

## 2020-03-11 NOTE — PROGRESS NOTES
[facility]  HEMATOLOGY AND ONCOLOGY    FOLLOW-UP VISIT NOTE    PATIENT NAME: Ismael Britton MRN # [id number]  DATE OF VISIT: Mar 11, 2020 YOB: 1985       REFERRING PROVIDER: Leonard Murillo PA-C  [address] [place] [address]    DIAGNOSIS: Essential thrombocythemia - CALR X896Vom*47    CURRENT INTERVENTIONS:  ASA    SUBJECTIVE   Ismael Britton is being followed for thrombocytosis     Ismael is seen for a follow up after completing work up for his thrombocytosis. He has no new complains.       PAST MEDICAL HISTORY     Past Medical History:   Diagnosis Date     Anxiety 9/2010     Asthma, mild intermittent          CURRENT OUTPATIENT MEDICATIONS     Current Outpatient Medications   Medication Sig     cetirizine (ZYRTEC) 10 MG tablet Take 1 tablet by mouth every evening.     fluticasone (FLONASE) 50 MCG/ACT nasal spray 2 sprays by Both Nostrils route daily.     No current facility-administered medications for this visit.         ALLERGIES    No Known Allergies     REVIEW OF SYSTEMS   As above in the HPI, o/w complete 12-point ROS was negative.     PHYSICAL EXAM   /71   Pulse 63   Temp 97.9  F (36.6  C) (Oral)   Resp 16   Wt 73 kg (161 lb)   SpO2 99%   BMI 25.60 kg/m    GEN: NAD  HEENT: PERRL, EOMI, no icterus, injection or pallor. Oropharynx is clear.  LYMPHATICs: no cervical or supraclavicular lymphadenopathy; no other abn lymphadenopathy  PULMONARY: clear with good air entry bilaterally  CARDIOVASCULAR: regular, no murmurs, rubs, or gallops  GASTROINTESTINAL: soft, non-tender, non-distended, normal bowel sounds, no hepatosplenomegaly by percussion or palpation  MUSCULOSKELTAL: warm, well perfused, no edema  NEURO: awake, alert and oriented to time place and person, cranial nerves intact - II - XII, no focal neurologic deficits  SKIN: no rashes     LABORATORY AND IMAGING STUDIES       No results for input(s): NA, POTASSIUM, CHLORIDE, CO2, ANIONGAP, BUN, CR, GLC,  KAHLIL in the last 93651 hours.  No results for input(s): MAG, PHOS in the last 68267 hours.  Recent Labs   Lab Test 03/03/20  0912 12/09/19  0900 09/09/19  1605 05/03/19  0845 04/12/19  1358   WBC 8.9 8.7 11.1* 8.7 10.3   HGB 16.0 15.2 15.4 14.8 15.7   PLT 1,080* 927* 833* 745* 799*   MCV 87 88 90 89 88   NEUTROPHIL 59.7 57.1 53.0 58.1 60.7     Recent Labs   Lab Test 03/03/20  0912 12/09/19  0900 04/12/19  1358    178 194     TSH   Date Value Ref Range Status   09/02/2010 1.28 0.4 - 5.0 mU/L Final     Lab Results   Component Value Date    PATH  04/12/2019     Patient Name: MONALISA ARAMBULA  MR#: [id number]  Specimen #: [id number]  Collected: 4/12/2019  Received: 4/12/2019  Reported: 4/12/2019 16:18  Ordering Phy(s): JOHN ANDINO    For improved result formatting, select 'View Enhanced Report Format' under   Linked Documents section.    TEST(S):  Peripheral Smear Morphology    FINAL DIAGNOSIS:  Peripheral blood morphology:  - Thrombocytosis, moderate.    COMMENT:  Reactive and neoplastic conditions should be excluded.  Next Generation   Sequencing - Oncology - MPN including  JAK2, RADHA and MPL should be performed to exclude a myeloproliferative   neoplasm.  Correlation with clinical  findings is recommended.    Electronically signed out by:    Eren Haywood M.D.    CLINICAL HISTORY:  34 years old male.  Indication for peripheral blood morphology:   Thrombocytosis.    PERIPHERAL BLOOD DATA:    Patient Value (Reference Range >18 year old male)  10.3     WBC (4.0-11.0 x 10*9/L)  5.31     RBC (4.4-5.9 x 10*12/L)  15.7     HGB (13.3-17.7 g/dL)  46.7     HCT (40.0-53.0 %)  88       MCV (78-100fL)  29.6     MCH (26.5-33.0 pg)  33.6     MCHC (31.5-36.5 g/dL)  12.5     RDW (10.0-15.0 %)  799      PLT (150-450 x 10*9/L)  1.1       RETIC (0.5-2.0%)    PERIPHERAL BLOOD DIFFERENTIAL  (Reference ranges >18 year old)    Percent  60.7  Neutrophils, segmented and bands  24.8  Lymphocytes  8.9   Monocytes  3.7    Eosinophils  1.4   Basophils  0.5   Immature granulocytes    Absolute  6.2   Neutrophils, segmented and bands  (1.6 - 8.3 x 10*9/L)  2.5   Lymphocytes  (0.8 - 5.3 x 10*9/L)  0.9   Monocytes  (0 -1.3 x 10*9/L)  0.4   Eosinophils  (0 - 0.7 x 10*9/L)  0.1   Basophils  (0 - 0.2 x 10*9/L)    PERIPHERAL MORPHOLOGY:    ERYTHROCYTES: The hemoglobin is recorded as 15.7 g/dL.  The erythrocytes   are normocytic normochromic.  There  is no significant anisopoikilocytosis.  There are no changes of   hyposplenism.    LEUKOCYTES: The white blood cell count is recorded as 10,300.  The   neutrophils, eosinophils, basophils,  monocytes and lymphocytes show mature morphology.  No left shift,   basophilia, monocytosis, dysplastic changes  or blasts are seen.    PLATELETS: The platelet count is recorded as 799,000.  The platelets show   normal size and granulation.  (Dictated by Eren Haywood MD 4- @ 4:17PM).    CPT Codes:  A: 02387-EMOR    TESTING LAB LOCATION:  [facility] [address] [place]  [id number] [phone]    COLLECTION SITE:  Client:  [facility]  Location:  [facility] (R)      [facility]  04/12/2019     Patient Name: MONALISA ARAMBULA  MR#: [id number]  Specimen #: [id number]  Collected: 4/12/2019 13:58  Received: 4/15/2019 08:25  Reported: 4/23/2019 17:22  Ordering Phy(s): JOHN ANDINO    For improved result formatting, select 'View Enhanced Report Format' under   Linked Documents section.  _________________________________________    TEST(S) REQUESTED:  Myeloproliferative Expanded NGSO    SPECIMEN DESCRIPTION:  Blood    SIGNIFICANT RESULTS    ---------------------------------------------------------------------  Detected Alterations of Known or Potential Pathogenicity: CALR B794Tps*47    Detected Alterations of Uncertain Significance: None    Genes with No Detected Alterations of the Amino Acid Sequence: JAK2,  MPL  ---------------------------------------------------------------------     INTERPRETATION OF RESULTS     ---------------------------------------------------------------------     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  Specifically the c.1154delinsCTTGTC, p.(Uji192Ipqbb*47) mutation   (NM_004343.3)     Mutatons in exon 9 of the CALR gene are detected in approximately 65-85%   of cases of JAK2 and MPL mutation  negative essential thrombocythemia (ET) and primary myelofibrosis (PMF)   [1-3].     Correlation with clinical information, histology and other diagnostic   tests is indicated.     References     1. Mari LAMAR, Aida CE, Tramaine EJ, et al. Somatic CALR mutations in   myeloproliferative neoplasms with  nonmutated JAK2. N Engl J Med. 2013;369(25):2391-405.     2. Jael BEAR, Gordo RL, Di T, et al. EYB783 mutations in   myeloproliferative and other myeloid  disorders: a study of 1182 patients. Blood. 2006;108(10):3472-6.     3. Pedro T, Ricky H, Sudhir AS, et al. Somatic mutations of   calreticulin in myeloproliferative  neoplasms. N Engl J Med. 2013;369(25):2379-90.     ---------------------------------------------------------------------    GENETIC ALTERATIONS    ---------------------------------------------------------------------  Detected Alterations of Known or Potential Pathogenicity  ---------------------------------------------------------------------  Gene: CALR  Alteration: U131Oer*47  c.1154A>C  Type of Alteration: Complex indel - frameshift  Significance: Pathogenic  Therapeutic Implications*: None  Additional Information: COSMIC: N/A Allele Frequency: 0.0% dbSNP:   [id number]  [https://www.ncbi.nlm.nih.gov/projects/SNP/snp_ref.cgi?od=kk4530029115]    ---------------------------------------------------------------------  Detected Alterations of Uncertain  Significance  ---------------------------------------------------------------------  None    Electronically Signed Out By:  KESHIA Whitfield    CPT Codes:  A: -FFANAP, MPNEXPNGSO    TESTING LAB LOCATION:  [facility]  [address] May...         ASSESSMENT AND PLAN   1. Essential thrombocythemia - CALR M225Qdh*47 +ve; low risk disease  2. ECOG PS 0  3. No other medical comobridity    I had a lengthy discussion with Ismael, who comes alone at this clinic visit.  His platelet count was normal in 09/2010 at 300,000.  His platelet counts have been markedly elevated since March2019 and now have increased to over a million. 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  This is seen in patients with essential thrombocythemia, which is a condition of elevated platelet counts.  This is usually in somatically acquired mutation.     He has low risk disease with his age under 60, lack of JAK2 mutation and no previous thrombosis. We could monitor him without intervention. His platelet counts of over a million could lead to acquired von-Willebrands disease. We would consider treatment if this happens. However until then we could safely observe him.     We again reviewed hydroxyurea which can be used to lower the platelet counts and decrease all of these side effects.  Hydroxyurea is an oral chemotherapeutic agent, which lowers the blood counts, including the white cells, red cells and platelets.  It will lower his platelet counts much more than his hemoglobin or his white cell count. He had questions on side effects which I reviewed including risk of secondary malignancies.        We will continue to follow him closely and possibly might have to start him on hydroxyurea if he were to develop thrombosis or have acquired von-Willebrands disease.

## 2020-03-11 NOTE — LETTER
3/11/2020         RE: Ismael Britton  [address]  [facility] [address]        Dear Colleague,    Thank you for referring your patient, Ismael Britton, to the [facility]. Please see a copy of my visit note below.    [facility]  HEMATOLOGY AND ONCOLOGY    FOLLOW-UP VISIT NOTE    PATIENT NAME: Ismael Britton MRN # [id number]  DATE OF VISIT: Mar 11, 2020 YOB: 1985       REFERRING PROVIDER: Leonard Murillo PA-C  [address] [place] [address]    DIAGNOSIS: Essential thrombocythemia - CALR [id number]*47    CURRENT INTERVENTIONS:  ASA    SUBJECTIVE   Ismael Britton is being followed for thrombocytosis     Ismael is seen for a follow up after completing work up for his thrombocytosis. He has no new complains.       PAST MEDICAL HISTORY     Past Medical History:   Diagnosis Date     Anxiety 9/2010     Asthma, mild intermittent          CURRENT OUTPATIENT MEDICATIONS     Current Outpatient Medications   Medication Sig     cetirizine (ZYRTEC) 10 MG tablet Take 1 tablet by mouth every evening.     fluticasone (FLONASE) 50 MCG/ACT nasal spray 2 sprays by Both Nostrils route daily.     No current facility-administered medications for this visit.         ALLERGIES    No Known Allergies     REVIEW OF SYSTEMS   As above in the HPI, o/w complete 12-point ROS was negative.     PHYSICAL EXAM   /71   Pulse 63   Temp 97.9  F (36.6  C) (Oral)   Resp 16   Wt 73 kg (161 lb)   SpO2 99%   BMI 25.60 kg/m    GEN: NAD  HEENT: PERRL, EOMI, no icterus, injection or pallor. Oropharynx is clear.  LYMPHATICs: no cervical or supraclavicular lymphadenopathy; no other abn lymphadenopathy  PULMONARY: clear with good air entry bilaterally  CARDIOVASCULAR: regular, no murmurs, rubs, or gallops  GASTROINTESTINAL: soft, non-tender, non-distended, normal bowel sounds, no hepatosplenomegaly by percussion or palpation  MUSCULOSKELTAL: warm, well perfused, no edema  NEURO: awake, alert and  oriented to time place and person, cranial nerves intact - II - XII, no focal neurologic deficits  SKIN: no rashes     LABORATORY AND IMAGING STUDIES       No results for input(s): NA, POTASSIUM, CHLORIDE, CO2, ANIONGAP, BUN, CR, GLC, KAHLIL in the last 24559 hours.  No results for input(s): MAG, PHOS in the last 81720 hours.  Recent Labs   Lab Test 03/03/20  0912 12/09/19  0900 09/09/19  1605 05/03/19  0845 04/12/19  1358   WBC 8.9 8.7 11.1* 8.7 10.3   HGB 16.0 15.2 15.4 14.8 15.7   PLT 1,080* 927* 833* 745* 799*   MCV 87 88 90 89 88   NEUTROPHIL 59.7 57.1 53.0 58.1 60.7     Recent Labs   Lab Test 03/03/20  0912 12/09/19  0900 04/12/19  1358    178 194     TSH   Date Value Ref Range Status   09/02/2010 1.28 0.4 - 5.0 mU/L Final     Lab Results   Component Value Date    PATH  04/12/2019     Patient Name: MONALISA ARAMBULA  MR#: [id number]  Specimen #: [id number]  Collected: 4/12/2019  Received: 4/12/2019  Reported: 4/12/2019 16:18  Ordering Phy(s): JOHN ANDINO    For improved result formatting, select 'View Enhanced Report Format' under   Linked Documents section.    TEST(S):  Peripheral Smear Morphology    FINAL DIAGNOSIS:  Peripheral blood morphology:  - Thrombocytosis, moderate.    COMMENT:  Reactive and neoplastic conditions should be excluded.  Next Generation   Sequencing - Oncology - MPN including  JAK2, RADHA and MPL should be performed to exclude a myeloproliferative   neoplasm.  Correlation with clinical  findings is recommended.    Electronically signed out by:    Eren Haywood M.D.    CLINICAL HISTORY:  34 years old male.  Indication for peripheral blood morphology:   Thrombocytosis.    PERIPHERAL BLOOD DATA:    Patient Value (Reference Range >18 year old male)  10.3     WBC (4.0-11.0 x 10*9/L)  5.31     RBC (4.4-5.9 x 10*12/L)  15.7     HGB (13.3-17.7 g/dL)  46.7     HCT (40.0-53.0 %)  88       MCV (78-100fL)  29.6     MCH (26.5-33.0 pg)  33.6     MCHC (31.5-36.5 g/dL)  12.5     RDW  (10.0-15.0 %)  799      PLT (150-450 x 10*9/L)  1.1       RETIC (0.5-2.0%)    PERIPHERAL BLOOD DIFFERENTIAL  (Reference ranges >18 year old)    Percent  60.7  Neutrophils, segmented and bands  24.8  Lymphocytes  8.9   Monocytes  3.7   Eosinophils  1.4   Basophils  0.5   Immature granulocytes    Absolute  6.2   Neutrophils, segmented and bands  (1.6 - 8.3 x 10*9/L)  2.5   Lymphocytes  (0.8 - 5.3 x 10*9/L)  0.9   Monocytes  (0 -1.3 x 10*9/L)  0.4   Eosinophils  (0 - 0.7 x 10*9/L)  0.1   Basophils  (0 - 0.2 x 10*9/L)    PERIPHERAL MORPHOLOGY:    ERYTHROCYTES: The hemoglobin is recorded as 15.7 g/dL.  The erythrocytes   are normocytic normochromic.  There  is no significant anisopoikilocytosis.  There are no changes of   hyposplenism.    LEUKOCYTES: The white blood cell count is recorded as 10,300.  The   neutrophils, eosinophils, basophils,  monocytes and lymphocytes show mature morphology.  No left shift,   basophilia, monocytosis, dysplastic changes  or blasts are seen.    PLATELETS: The platelet count is recorded as 799,000.  The platelets show   normal size and granulation.  (Dictated by Eren Haywood MD 4- @ 4:17PM).    CPT Codes:  A: 20833-KBXP    TESTING LAB LOCATION:  [facility] [address] [place]  [address] [phone]    COLLECTION SITE:  Client:  [facility]  Location:  [facility] (R)      PATH  04/12/2019     Patient Name: MONALISA ARAMBULA  MR#: [id number]  Specimen #: [id number]  Collected: 4/12/2019 13:58  Received: 4/15/2019 08:25  Reported: 4/23/2019 17:22  Ordering Phy(s): JOHN ANDINO    For improved result formatting, select 'View Enhanced Report Format' under   Linked Documents section.  _________________________________________    TEST(S) REQUESTED:  Myeloproliferative Expanded NGSO    SPECIMEN DESCRIPTION:  Blood    SIGNIFICANT RESULTS    ---------------------------------------------------------------------  Detected Alterations of Known  or Potential Pathogenicity: CALR H887Qmc*47    Detected Alterations of Uncertain Significance: None    Genes with No Detected Alterations of the Amino Acid Sequence: JAK2, MPL  ---------------------------------------------------------------------     INTERPRETATION OF RESULTS     ---------------------------------------------------------------------     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  Specifically the c.1154delinsCTTGTC, p.(God373Beslt*47) mutation   (NM_004343.3)     Mutatons in exon 9 of the CALR gene are detected in approximately 65-85%   of cases of JAK2 and MPL mutation  negative essential thrombocythemia (ET) and primary myelofibrosis (PMF)   [1-3].     Correlation with clinical information, histology and other diagnostic   tests is indicated.     References     1. Mari J, Aida CE, Tramaine EJ, et al. Somatic CALR mutations in   myeloproliferative neoplasms with  nonmutated JAK2. N Engl J Med. 2013;369(25):2391-405.     2. Jael BEAR, Gordo RL, Di T, et al. JFJ953 mutations in   myeloproliferative and other myeloid  disorders: a study of 1182 patients. Blood. 2006;108(10):3472-6.     3. Pedro T, Ricky H, Sudhir AS, et al. Somatic mutations of   calreticulin in myeloproliferative  neoplasms. N Engl J Med. 2013;369(25):2379-90.     ---------------------------------------------------------------------    GENETIC ALTERATIONS    ---------------------------------------------------------------------  Detected Alterations of Known or Potential Pathogenicity  ---------------------------------------------------------------------  Gene: CALR  Alteration: L067Hbn*47  c.1154A>C  Type of Alteration: Complex indel - frameshift  Significance: Pathogenic  Therapeutic Implications*: None  Additional Information: COSMIC: N/A Allele Frequency: 0.0% dbSNP:    [id number]  [https://www.ncbi.nlm.nih.gov/projects/SNP/snp_ref.cgi?ev=eb2710506992]    ---------------------------------------------------------------------  Detected Alterations of Uncertain Significance  ---------------------------------------------------------------------  None    Electronically Signed Out By:  KESHIA Whitfield    CPT Codes:  A: -DCENEL, MPNEXPNGSO    TESTING LAB LOCATION:  [place] [address] May...         ASSESSMENT AND PLAN   1. Essential thrombocythemia - CALR N985Amm*47 +ve; low risk disease  2. ECOG PS 0  3. No other medical comobridity    I had a lengthy discussion with Ismael, who comes alone at this clinic visit.  His platelet count was normal in 09/2010 at 300,000.  His platelet counts have been markedly elevated since March2019 and now have increased to over a million. 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  This is seen in patients with essential thrombocythemia, which is a condition of elevated platelet counts.  This is usually in somatically acquired mutation.     He has low risk disease with his age under 60, lack of JAK2 mutation and no previous thrombosis. We could monitor him without intervention. His platelet counts of over a million could lead to acquired von-Willebrands disease. We would consider treatment if this happens. However until then we could safely observe him.     We again reviewed hydroxyurea which can be used to lower the platelet counts and decrease all of these side effects.  Hydroxyurea is an oral chemotherapeutic agent, which lowers the blood counts, including the white cells, red cells and platelets.  It will lower his platelet counts much more than his hemoglobin or his white cell count. He had questions on side effects which I reviewed including risk of secondary malignancies.        We will continue to follow him closely and possibly might have to start him on hydroxyurea if he were to develop thrombosis or have  acquired von-Willebrands disease.          Again, thank you for allowing me to participate in the care of your patient.        Sincerely,        Charlie Alejo MD

## 2020-03-11 NOTE — PATIENT INSTRUCTIONS
Labs scheduled on 9/14/20.  Appointment with Dr. Alejo scheduled on 9/21/20.  Natasha Gonzalez RN, BSN, OCN on 3/11/2020 at 4:57 PM

## 2020-10-27 ENCOUNTER — HOSPITAL ENCOUNTER (OUTPATIENT)
Facility: CLINIC | Age: 35
Setting detail: SPECIMEN
Discharge: HOME | End: 2020-10-27
Attending: INTERNAL MEDICINE | Admitting: INTERNAL MEDICINE
Payer: COMMERCIAL

## 2020-10-27 ENCOUNTER — PATIENT OUTREACH (OUTPATIENT)
Dept: ONCOLOGY | Facility: CLINIC | Age: 35
End: 2020-10-27

## 2020-10-27 DIAGNOSIS — D47.3 ESSENTIAL THROMBOCYTHEMIA (H): Primary | ICD-10-CM

## 2020-10-27 DIAGNOSIS — D75.839 THROMBOCYTOSIS: ICD-10-CM

## 2020-10-27 LAB
BASOPHILS # BLD AUTO: 0.1 10E9/L (ref 0–0.2)
BASOPHILS NFR BLD AUTO: 1.3 %
DIFFERENTIAL METHOD BLD: ABNORMAL
EOSINOPHIL # BLD AUTO: 0.8 10E9/L (ref 0–0.7)
EOSINOPHIL NFR BLD AUTO: 7.5 %
ERYTHROCYTE [DISTWIDTH] IN BLOOD BY AUTOMATED COUNT: 13 % (ref 10–15)
HCT VFR BLD AUTO: 45.5 % (ref 40–53)
HGB BLD-MCNC: 14.7 G/DL (ref 13.3–17.7)
IMM GRANULOCYTES # BLD: 0.1 10E9/L (ref 0–0.4)
IMM GRANULOCYTES NFR BLD: 0.5 %
LDH SERPL L TO P-CCNC: 217 U/L (ref 85–227)
LYMPHOCYTES # BLD AUTO: 3 10E9/L (ref 0.8–5.3)
LYMPHOCYTES NFR BLD AUTO: 29 %
MCH RBC QN AUTO: 29.5 PG (ref 26.5–33)
MCHC RBC AUTO-ENTMCNC: 32.3 G/DL (ref 31.5–36.5)
MCV RBC AUTO: 91 FL (ref 78–100)
MONOCYTES # BLD AUTO: 0.9 10E9/L (ref 0–1.3)
MONOCYTES NFR BLD AUTO: 8.3 %
NEUTROPHILS # BLD AUTO: 5.6 10E9/L (ref 1.6–8.3)
NEUTROPHILS NFR BLD AUTO: 53.4 %
NRBC # BLD AUTO: 0 10*3/UL
NRBC BLD AUTO-RTO: 0 /100
PLATELET # BLD AUTO: 1288 10E9/L (ref 150–450)
PLATELET # BLD EST: ABNORMAL 10*3/UL
RBC # BLD AUTO: 4.98 10E12/L (ref 4.4–5.9)
RBC MORPH BLD: ABNORMAL
WBC # BLD AUTO: 10.4 10E9/L (ref 4–11)

## 2020-10-27 PROCEDURE — 83615 LACTATE (LD) (LDH) ENZYME: CPT | Performed by: INTERNAL MEDICINE

## 2020-10-27 PROCEDURE — 36415 COLL VENOUS BLD VENIPUNCTURE: CPT

## 2020-10-27 PROCEDURE — 85025 COMPLETE CBC W/AUTO DIFF WBC: CPT | Performed by: INTERNAL MEDICINE

## 2020-10-27 PROCEDURE — 99207 PR NO CHARGE LOS: CPT

## 2020-10-27 NOTE — PROGRESS NOTES
DATE:  10/27/2020   TIME OF RECEIPT FROM LAB:  10:53am  LAB TEST:  CBC with plts  LAB VALUE:  Plt 1,288  RESULTS GIVEN WITH READ-BACK TO (PROVIDER):  Dr. Charlie Alejo  TIME LAB VALUE REPORTED TO PROVIDER:   10:55am

## 2020-10-30 ENCOUNTER — HOSPITAL ENCOUNTER (OUTPATIENT)
Facility: CLINIC | Age: 35
Setting detail: SPECIMEN
Discharge: HOME | End: 2020-10-30
Attending: INTERNAL MEDICINE | Admitting: INTERNAL MEDICINE
Payer: COMMERCIAL

## 2020-10-30 ENCOUNTER — ONCOLOGY VISIT (OUTPATIENT)
Dept: ONCOLOGY | Facility: CLINIC | Age: 35
End: 2020-10-30
Attending: INTERNAL MEDICINE
Payer: COMMERCIAL

## 2020-10-30 VITALS
TEMPERATURE: 98.6 F | RESPIRATION RATE: 16 BRPM | SYSTOLIC BLOOD PRESSURE: 123 MMHG | BODY MASS INDEX: 25.15 KG/M2 | OXYGEN SATURATION: 98 % | HEART RATE: 63 BPM | WEIGHT: 158.2 LBS | DIASTOLIC BLOOD PRESSURE: 76 MMHG

## 2020-10-30 DIAGNOSIS — D47.3 ESSENTIAL THROMBOCYTHEMIA (H): ICD-10-CM

## 2020-10-30 DIAGNOSIS — D75.839 THROMBOCYTOSIS: ICD-10-CM

## 2020-10-30 LAB
APTT PPP: 40 SEC (ref 22–37)
BASOPHILS # BLD AUTO: 0.1 10E9/L (ref 0–0.2)
BASOPHILS NFR BLD AUTO: 1.4 %
DIFFERENTIAL METHOD BLD: ABNORMAL
EOSINOPHIL # BLD AUTO: 0.7 10E9/L (ref 0–0.7)
EOSINOPHIL NFR BLD AUTO: 7 %
ERYTHROCYTE [DISTWIDTH] IN BLOOD BY AUTOMATED COUNT: 13 % (ref 10–15)
FIBRINOGEN PPP-MCNC: 274 MG/DL (ref 200–420)
HCT VFR BLD AUTO: 45.9 % (ref 40–53)
HGB BLD-MCNC: 15.2 G/DL (ref 13.3–17.7)
IMM GRANULOCYTES # BLD: 0.1 10E9/L (ref 0–0.4)
IMM GRANULOCYTES NFR BLD: 0.5 %
INR PPP: 1.21 (ref 0.86–1.14)
LDH SERPL L TO P-CCNC: 232 U/L (ref 85–227)
LYMPHOCYTES # BLD AUTO: 2.3 10E9/L (ref 0.8–5.3)
LYMPHOCYTES NFR BLD AUTO: 23 %
MCH RBC QN AUTO: 29.7 PG (ref 26.5–33)
MCHC RBC AUTO-ENTMCNC: 33.1 G/DL (ref 31.5–36.5)
MCV RBC AUTO: 90 FL (ref 78–100)
MONOCYTES # BLD AUTO: 0.8 10E9/L (ref 0–1.3)
MONOCYTES NFR BLD AUTO: 7.7 %
NEUTROPHILS # BLD AUTO: 6.1 10E9/L (ref 1.6–8.3)
NEUTROPHILS NFR BLD AUTO: 60.4 %
NRBC # BLD AUTO: 0 10*3/UL
NRBC BLD AUTO-RTO: 0 /100
PLATELET # BLD AUTO: 1369 10E9/L (ref 150–450)
RBC # BLD AUTO: 5.12 10E12/L (ref 4.4–5.9)
WBC # BLD AUTO: 10 10E9/L (ref 4–11)

## 2020-10-30 PROCEDURE — 85240 CLOT FACTOR VIII AHG 1 STAGE: CPT | Performed by: INTERNAL MEDICINE

## 2020-10-30 PROCEDURE — 85610 PROTHROMBIN TIME: CPT | Performed by: INTERNAL MEDICINE

## 2020-10-30 PROCEDURE — 85245 CLOT FACTOR VIII VW RISTOCTN: CPT | Mod: 59 | Performed by: INTERNAL MEDICINE

## 2020-10-30 PROCEDURE — 999N001035 HC STATISTIC THROMBIN TIME NC: Performed by: INTERNAL MEDICINE

## 2020-10-30 PROCEDURE — 85025 COMPLETE CBC W/AUTO DIFF WBC: CPT | Performed by: INTERNAL MEDICINE

## 2020-10-30 PROCEDURE — 83615 LACTATE (LD) (LDH) ENZYME: CPT | Performed by: INTERNAL MEDICINE

## 2020-10-30 PROCEDURE — G0463 HOSPITAL OUTPT CLINIC VISIT: HCPCS

## 2020-10-30 PROCEDURE — 85245 CLOT FACTOR VIII VW RISTOCTN: CPT | Performed by: INTERNAL MEDICINE

## 2020-10-30 PROCEDURE — 85384 FIBRINOGEN ACTIVITY: CPT | Performed by: INTERNAL MEDICINE

## 2020-10-30 PROCEDURE — 85730 THROMBOPLASTIN TIME PARTIAL: CPT | Performed by: INTERNAL MEDICINE

## 2020-10-30 PROCEDURE — 99213 OFFICE O/P EST LOW 20 MIN: CPT | Performed by: INTERNAL MEDICINE

## 2020-10-30 PROCEDURE — 85246 CLOT FACTOR VIII VW ANTIGEN: CPT | Performed by: INTERNAL MEDICINE

## 2020-10-30 ASSESSMENT — PAIN SCALES - GENERAL: PAINLEVEL: NO PAIN (0)

## 2020-10-30 NOTE — NURSING NOTE
"Oncology Rooming Note    October 30, 2020 3:02 PM   Ismael Britton is a 35 year old male who presents for:    Chief Complaint   Patient presents with     Oncology Clinic Visit     Essential thrombocythemia     Initial Vitals: There were no vitals taken for this visit. Estimated body mass index is 25.6 kg/m  as calculated from the following:    Height as of 9/9/19: 1.689 m (5' 6.5\").    Weight as of 3/11/20: 73 kg (161 lb). There is no height or weight on file to calculate BSA.  Data Unavailable Comment: Data Unavailable   No LMP for male patient.  Allergies reviewed: Yes  Medications reviewed: Yes    Medications: Medication refills not needed today.  [facility] [address] [address]    Clinical concerns: f/u       Key Ware CMA              "

## 2020-10-30 NOTE — PROGRESS NOTES
[facility]  HEMATOLOGY AND ONCOLOGY    FOLLOW-UP VISIT NOTE    PATIENT NAME: Ismael Britton MRN # [id number]  DATE OF VISIT: Oct 30, 2020 YOB: 1985       REFERRING PROVIDER: Leonard Murillo PA-C  [address] [place] [address]    DIAGNOSIS: Essential thrombocythemia - CALR K559Wnt*47    CURRENT INTERVENTIONS:  ASA    SUBJECTIVE   Ismael Britton is being followed for thrombocytosis     Ismael is seen for a follow up after completing work up for his thrombocytosis. He has no new complains.       PAST MEDICAL HISTORY     Past Medical History:   Diagnosis Date     Anxiety 9/2010     Asthma, mild intermittent          CURRENT OUTPATIENT MEDICATIONS     Current Outpatient Medications   Medication Sig     cetirizine (ZYRTEC) 10 MG tablet Take 1 tablet by mouth every evening.     fluticasone (FLONASE) 50 MCG/ACT nasal spray 2 sprays by Both Nostrils route daily.     No current facility-administered medications for this visit.         ALLERGIES    No Known Allergies     REVIEW OF SYSTEMS   As above in the HPI, o/w complete 12-point ROS was negative.     PHYSICAL EXAM   /76   Pulse 63   Temp 98.6  F (37  C) (Tympanic)   Resp 16   Wt 71.8 kg (158 lb 3.2 oz)   SpO2 98%   BMI 25.15 kg/m    GEN: NAD  HEENT: PERRL, EOMI, no icterus, injection or pallor. Oropharynx is clear.  LYMPHATICs: no cervical or supraclavicular lymphadenopathy; no other abn lymphadenopathy  PULMONARY: clear with good air entry bilaterally  CARDIOVASCULAR: regular, no murmurs, rubs, or gallops  GASTROINTESTINAL: soft, non-tender, non-distended, normal bowel sounds, no hepatosplenomegaly by percussion or palpation  MUSCULOSKELTAL: warm, well perfused, no edema  NEURO: awake, alert and oriented to time place and person, cranial nerves intact - II - XII, no focal neurologic deficits  SKIN: no rashes     LABORATORY AND IMAGING STUDIES       No results for input(s): NA, POTASSIUM, CHLORIDE, CO2, ANIONGAP, BUN,  CR, GLC, KAHLIL in the last 32378 hours.  No results for input(s): MAG, PHOS in the last 74844 hours.  Recent Labs   Lab Test 03/03/20  0912 12/09/19  0900 09/09/19  1605 05/03/19  0845 04/12/19  1358   WBC 8.9 8.7 11.1* 8.7 10.3   HGB 16.0 15.2 15.4 14.8 15.7   PLT 1,080* 927* 833* 745* 799*   MCV 87 88 90 89 88   NEUTROPHIL 59.7 57.1 53.0 58.1 60.7     Recent Labs   Lab Test 03/03/20  0912 12/09/19  0900 04/12/19  1358    178 194     TSH   Date Value Ref Range Status   09/02/2010 1.28 0.4 - 5.0 mU/L Final     Lab Results   Component Value Date    PATH  04/12/2019     Patient Name: MONALISA ARAMBULA  MR#: [id number]  Specimen #: [id number]  Collected: 4/12/2019  Received: 4/12/2019  Reported: 4/12/2019 16:18  Ordering Phy(s): JOHN ANDINO    For improved result formatting, select 'View Enhanced Report Format' under   Linked Documents section.    TEST(S):  Peripheral Smear Morphology    FINAL DIAGNOSIS:  Peripheral blood morphology:  - Thrombocytosis, moderate.    COMMENT:  Reactive and neoplastic conditions should be excluded.  Next Generation   Sequencing - Oncology - MPN including  JAK2, RADHA and MPL should be performed to exclude a myeloproliferative   neoplasm.  Correlation with clinical  findings is recommended.    Electronically signed out by:    Eren Haywood M.D.    CLINICAL HISTORY:  34 years old male.  Indication for peripheral blood morphology:   Thrombocytosis.    PERIPHERAL BLOOD DATA:    Patient Value (Reference Range >18 year old male)  10.3     WBC (4.0-11.0 x 10*9/L)  5.31     RBC (4.4-5.9 x 10*12/L)  15.7     HGB (13.3-17.7 g/dL)  46.7     HCT (40.0-53.0 %)  88       MCV (78-100fL)  29.6     MCH (26.5-33.0 pg)  33.6     MCHC (31.5-36.5 g/dL)  12.5     RDW (10.0-15.0 %)  799      PLT (150-450 x 10*9/L)  1.1       RETIC (0.5-2.0%)    PERIPHERAL BLOOD DIFFERENTIAL  (Reference ranges >18 year old)    Percent  60.7  Neutrophils, segmented and bands  24.8  Lymphocytes  8.9   Monocytes  3.7    Eosinophils  1.4   Basophils  0.5   Immature granulocytes    Absolute  6.2   Neutrophils, segmented and bands  (1.6 - 8.3 x 10*9/L)  2.5   Lymphocytes  (0.8 - 5.3 x 10*9/L)  0.9   Monocytes  (0 -1.3 x 10*9/L)  0.4   Eosinophils  (0 - 0.7 x 10*9/L)  0.1   Basophils  (0 - 0.2 x 10*9/L)    PERIPHERAL MORPHOLOGY:    ERYTHROCYTES: The hemoglobin is recorded as 15.7 g/dL.  The erythrocytes   are normocytic normochromic.  There  is no significant anisopoikilocytosis.  There are no changes of   hyposplenism.    LEUKOCYTES: The white blood cell count is recorded as 10,300.  The   neutrophils, eosinophils, basophils,  monocytes and lymphocytes show mature morphology.  No left shift,   basophilia, monocytosis, dysplastic changes  or blasts are seen.    PLATELETS: The platelet count is recorded as 799,000.  The platelets show   normal size and granulation.  (Dictated by Eren Haywood MD 4- @ 4:17PM).    CPT Codes:  A: 63136-OFIL    TESTING LAB LOCATION:  [facility] [address] [place]  [id number] [phone]    COLLECTION SITE:  Client:  [facility]  Location:  [facility] (R)      [facility]  04/12/2019     Patient Name: MONALISA ARAMBULA  MR#: [id number]  Specimen #: [id number]  Collected: 4/12/2019 13:58  Received: 4/15/2019 08:25  Reported: 4/23/2019 17:22  Ordering Phy(s): JOHN ANDINO    For improved result formatting, select 'View Enhanced Report Format' under   Linked Documents section.  _________________________________________    TEST(S) REQUESTED:  Myeloproliferative Expanded NGSO    SPECIMEN DESCRIPTION:  Blood    SIGNIFICANT RESULTS    ---------------------------------------------------------------------  Detected Alterations of Known or Potential Pathogenicity: CALR U426Xsr*47    Detected Alterations of Uncertain Significance: None    Genes with No Detected Alterations of the Amino Acid Sequence: JAK2,  MPL  ---------------------------------------------------------------------     INTERPRETATION OF RESULTS     ---------------------------------------------------------------------     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  Specifically the c.1154delinsCTTGTC, p.(Vwr126Lbktd*47) mutation   (NM_004343.3)     Mutatons in exon 9 of the CALR gene are detected in approximately 65-85%   of cases of JAK2 and MPL mutation  negative essential thrombocythemia (ET) and primary myelofibrosis (PMF)   [1-3].     Correlation with clinical information, histology and other diagnostic   tests is indicated.     References     1. Mari LAMAR, Aida CE, Tramaine EJ, et al. Somatic CALR mutations in   myeloproliferative neoplasms with  nonmutated JAK2. N Engl J Med. 2013;369(25):2391-405.     2. Jael BEAR, Gordo RL, Di T, et al. HKK422 mutations in   myeloproliferative and other myeloid  disorders: a study of 1182 patients. Blood. 2006;108(10):3472-6.     3. Pedro T, Ricky H, Sudhir AS, et al. Somatic mutations of   calreticulin in myeloproliferative  neoplasms. N Engl J Med. 2013;369(25):2379-90.     ---------------------------------------------------------------------    GENETIC ALTERATIONS    ---------------------------------------------------------------------  Detected Alterations of Known or Potential Pathogenicity  ---------------------------------------------------------------------  Gene: CALR  Alteration: I533Nxk*47  c.1154A>C  Type of Alteration: Complex indel - frameshift  Significance: Pathogenic  Therapeutic Implications*: None  Additional Information: COSMIC: N/A Allele Frequency: 0.0% dbSNP:   [id number]  [https://www.ncbi.nlm.nih.gov/projects/SNP/snp_ref.cgi?jm=ie2811267557]    ---------------------------------------------------------------------  Detected Alterations of Uncertain  Significance  ---------------------------------------------------------------------  None    Electronically Signed Out By:  KESHIA Whitfield    CPT Codes:  A: -WAFHFR, MPNEXPNGSO    TESTING LAB LOCATION:  [facility]  [address]...         ASSESSMENT AND PLAN   1. Essential thrombocythemia - CALR E321Xnj*47 +ve; low risk disease  2. ECOG PS 0  3. No other medical comobridity    I had a lengthy discussion with Ismael, who comes alone at this clinic visit.  His platelet count was normal in 09/2010 at 300,000.  His platelet counts have been markedly elevated since March2019 and now have increased to over a million. 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  This is seen in patients with essential thrombocythemia, which is a condition of elevated platelet counts.  This is usually in somatically acquired mutation.     He has low risk disease with his age under 60, lack of JAK2 mutation and no previous thrombosis. We could monitor him without intervention. His platelet counts of over a million could lead to acquired von-Willebrands disease. We would consider treatment if this happens. However until then we could safely observe him. Labs for aquired von Willebrands disease were missed on labs prior to this visit and I had them drawn.      We again reviewed hydroxyurea which can be used to lower the platelet counts and decrease all of these side effects.  Hydroxyurea is an oral chemotherapeutic agent, which lowers the blood counts, including the white cells, red cells and platelets.  It will lower his platelet counts much more than his hemoglobin or his white cell count. He had questions on side effects which I reviewed including risk of secondary malignancies.      We will continue to follow him closely and possibly might have to start him on hydroxyurea if he were to develop thrombosis or have acquired von-Willebrands disease.

## 2020-10-30 NOTE — LETTER
10/30/2020         RE: Ismael Britton  [address]  [facility] [address]        Dear Colleague,    Thank you for referring your patient, Ismael Britton, to the [facility]. Please see a copy of my visit note below.    [facility]  HEMATOLOGY AND ONCOLOGY    FOLLOW-UP VISIT NOTE    PATIENT NAME: Ismael Britton MRN # [id number]  DATE OF VISIT: Oct 30, 2020 YOB: 1985       REFERRING PROVIDER: Leonard Murillo PA-C  [address] [place] [address]    DIAGNOSIS: Essential thrombocythemia - CALR [id number]*47    CURRENT INTERVENTIONS:  ASA    SUBJECTIVE   Ismael Britton is being followed for thrombocytosis     Ismael is seen for a follow up after completing work up for his thrombocytosis. He has no new complains.       PAST MEDICAL HISTORY     Past Medical History:   Diagnosis Date     Anxiety 9/2010     Asthma, mild intermittent          CURRENT OUTPATIENT MEDICATIONS     Current Outpatient Medications   Medication Sig     cetirizine (ZYRTEC) 10 MG tablet Take 1 tablet by mouth every evening.     fluticasone (FLONASE) 50 MCG/ACT nasal spray 2 sprays by Both Nostrils route daily.     No current facility-administered medications for this visit.         ALLERGIES    No Known Allergies     REVIEW OF SYSTEMS   As above in the HPI, o/w complete 12-point ROS was negative.     PHYSICAL EXAM   /76   Pulse 63   Temp 98.6  F (37  C) (Tympanic)   Resp 16   Wt 71.8 kg (158 lb 3.2 oz)   SpO2 98%   BMI 25.15 kg/m    GEN: NAD  HEENT: PERRL, EOMI, no icterus, injection or pallor. Oropharynx is clear.  LYMPHATICs: no cervical or supraclavicular lymphadenopathy; no other abn lymphadenopathy  PULMONARY: clear with good air entry bilaterally  CARDIOVASCULAR: regular, no murmurs, rubs, or gallops  GASTROINTESTINAL: soft, non-tender, non-distended, normal bowel sounds, no hepatosplenomegaly by percussion or palpation  MUSCULOSKELTAL: warm, well perfused,  no edema  NEURO: awake, alert and oriented to time place and person, cranial nerves intact - II - XII, no focal neurologic deficits  SKIN: no rashes     LABORATORY AND IMAGING STUDIES       No results for input(s): NA, POTASSIUM, CHLORIDE, CO2, ANIONGAP, BUN, CR, GLC, KAHLIL in the last 73771 hours.  No results for input(s): MAG, PHOS in the last 44139 hours.  Recent Labs   Lab Test 03/03/20  0912 12/09/19  0900 09/09/19  1605 05/03/19  0845 04/12/19  1358   WBC 8.9 8.7 11.1* 8.7 10.3   HGB 16.0 15.2 15.4 14.8 15.7   PLT 1,080* 927* 833* 745* 799*   MCV 87 88 90 89 88   NEUTROPHIL 59.7 57.1 53.0 58.1 60.7     Recent Labs   Lab Test 03/03/20  0912 12/09/19  0900 04/12/19  1358    178 194     TSH   Date Value Ref Range Status   09/02/2010 1.28 0.4 - 5.0 mU/L Final     Lab Results   Component Value Date    PATH  04/12/2019     Patient Name: MONALISA ARAMBULA  MR#: [id number]  Specimen #: [id number]  Collected: 4/12/2019  Received: 4/12/2019  Reported: 4/12/2019 16:18  Ordering Phy(s): JOHN ANDINO    For improved result formatting, select 'View Enhanced Report Format' under   Linked Documents section.    TEST(S):  Peripheral Smear Morphology    FINAL DIAGNOSIS:  Peripheral blood morphology:  - Thrombocytosis, moderate.    COMMENT:  Reactive and neoplastic conditions should be excluded.  Next Generation   Sequencing - Oncology - MPN including  JAK2, RADHA and MPL should be performed to exclude a myeloproliferative   neoplasm.  Correlation with clinical  findings is recommended.    Electronically signed out by:    Eren Haywood M.D.    CLINICAL HISTORY:  34 years old male.  Indication for peripheral blood morphology:   Thrombocytosis.    PERIPHERAL BLOOD DATA:    Patient Value (Reference Range >18 year old male)  10.3     WBC (4.0-11.0 x 10*9/L)  5.31     RBC (4.4-5.9 x 10*12/L)  15.7     HGB (13.3-17.7 g/dL)  46.7     HCT (40.0-53.0 %)  88       MCV (78-100fL)  29.6     MCH (26.5-33.0 pg)  33.6     MCHC  (31.5-36.5 g/dL)  12.5     RDW (10.0-15.0 %)  799      PLT (150-450 x 10*9/L)  1.1       RETIC (0.5-2.0%)    PERIPHERAL BLOOD DIFFERENTIAL  (Reference ranges >18 year old)    Percent  60.7  Neutrophils, segmented and bands  24.8  Lymphocytes  8.9   Monocytes  3.7   Eosinophils  1.4   Basophils  0.5   Immature granulocytes    Absolute  6.2   Neutrophils, segmented and bands  (1.6 - 8.3 x 10*9/L)  2.5   Lymphocytes  (0.8 - 5.3 x 10*9/L)  0.9   Monocytes  (0 -1.3 x 10*9/L)  0.4   Eosinophils  (0 - 0.7 x 10*9/L)  0.1   Basophils  (0 - 0.2 x 10*9/L)    PERIPHERAL MORPHOLOGY:    ERYTHROCYTES: The hemoglobin is recorded as 15.7 g/dL.  The erythrocytes   are normocytic normochromic.  There  is no significant anisopoikilocytosis.  There are no changes of   hyposplenism.    LEUKOCYTES: The white blood cell count is recorded as 10,300.  The   neutrophils, eosinophils, basophils,  monocytes and lymphocytes show mature morphology.  No left shift,   basophilia, monocytosis, dysplastic changes  or blasts are seen.    PLATELETS: The platelet count is recorded as 799,000.  The platelets show   normal size and granulation.  (Dictated by Eren Haywood MD 4- @ 4:17PM).    CPT Codes:  A: 82485-QEFR    TESTING LAB LOCATION:  [facility] [address] [place]  [id number] [phone]    COLLECTION SITE:  Client:  [facility]  Location:  [facility] (R)      PATH  04/12/2019     Patient Name: MONALISA ARAMBULA  MR#: [id number]  Specimen #: [id number]  Collected: 4/12/2019 13:58  Received: 4/15/2019 08:25  Reported: 4/23/2019 17:22  Ordering Phy(s): JOHN ANDINO    For improved result formatting, select 'View Enhanced Report Format' under   Linked Documents section.  _________________________________________    TEST(S) REQUESTED:  Myeloproliferative Expanded NGSO    SPECIMEN DESCRIPTION:  Blood    SIGNIFICANT  RESULTS    ---------------------------------------------------------------------  Detected Alterations of Known or Potential Pathogenicity: CALR [id number]*47    Detected Alterations of Uncertain Significance: None    Genes with No Detected Alterations of the Amino Acid Sequence: JAK2, MPL  ---------------------------------------------------------------------     INTERPRETATION OF RESULTS     ---------------------------------------------------------------------     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  Specifically the c.1154delinsCTTGTC, p.(Khi191Ilylv*47) mutation   (NM_[id number])     Mutatons in exon 9 of the CALR gene are detected in approximately 65-85%   of cases of JAK2 and MPL mutation  negative essential thrombocythemia (ET) and primary myelofibrosis (PMF)   [1-3].     Correlation with clinical information, histology and other diagnostic   tests is indicated.     References     1. Mari J, Aida CE, Tramaine EJ, et al. Somatic CALR mutations in   myeloproliferative neoplasms with  nonmutated JAK2. N Engl J Med. 2013;369(25):2391-405.     2. Jael AD, Gordo RL, Di T, et al. CLO496 mutations in   myeloproliferative and other myeloid  disorders: a study of 1182 patients. Blood. 2006;108(10):3472-6.     3. Pedro T, Ricky H, Sudhir AS, et al. Somatic mutations of   calreticulin in myeloproliferative  neoplasms. N Engl J Med. 2013;369(25):2379-90.     ---------------------------------------------------------------------    GENETIC ALTERATIONS    ---------------------------------------------------------------------  Detected Alterations of Known or Potential Pathogenicity  ---------------------------------------------------------------------  Gene: CALR  Alteration: T973Ana*47  c.1154A>C  Type of Alteration: Complex indel - frameshift  Significance: Pathogenic  Therapeutic Implications*: None  Additional Information: COSMIC: N/A Allele Frequency: 0.0% dbSNP:    [id number]  [https://www.ncbi.nlm.nih.gov/projects/SNP/snp_ref.cgi?uq=uj4208019750]    ---------------------------------------------------------------------  Detected Alterations of Uncertain Significance  ---------------------------------------------------------------------  None    Electronically Signed Out By:  KESHIA Whitfield    CPT Codes:  A: -REWRLP, MPNEXPNGSO    TESTING LAB LOCATION:  [place] [address] [address]...         ASSESSMENT AND PLAN   1. Essential thrombocythemia - CALR O334Xit*47 +ve; low risk disease  2. ECOG PS 0  3. No other medical comobridity    I had a lengthy discussion with Ismael, who comes alone at this clinic visit.  His platelet count was normal in 09/2010 at 300,000.  His platelet counts have been markedly elevated since March2019 and now have increased to over a million. 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  This is seen in patients with essential thrombocythemia, which is a condition of elevated platelet counts.  This is usually in somatically acquired mutation.     He has low risk disease with his age under 60, lack of JAK2 mutation and no previous thrombosis. We could monitor him without intervention. His platelet counts of over a million could lead to acquired von-Willebrands disease. We would consider treatment if this happens. However until then we could safely observe him. Labs for aquired von Willebrands disease were missed on labs prior to this visit and I had them drawn.      We again reviewed hydroxyurea which can be used to lower the platelet counts and decrease all of these side effects.  Hydroxyurea is an oral chemotherapeutic agent, which lowers the blood counts, including the white cells, red cells and platelets.  It will lower his platelet counts much more than his hemoglobin or his white cell count. He had questions on side effects which I reviewed including risk of secondary malignancies.      We will continue to follow him  closely and possibly might have to start him on hydroxyurea if he were to develop thrombosis or have acquired von-Willebrands disease.            Again, thank you for allowing me to participate in the care of your patient.        Sincerely,        Charlie Alejo MD

## 2020-11-02 LAB
FACT VIII ACT/NOR PPP: 62 % (ref 55–200)
VWF CBA/VWF AG PPP IA-RTO: 80 % (ref 50–200)
VWF:AC ACT/NOR PPP IA: 54 % (ref 50–180)

## 2020-11-04 LAB — VWF:RCO ACT/NOR PPP PL AGG: 39 % (ref 51–215)

## 2020-12-06 ENCOUNTER — HEALTH MAINTENANCE LETTER (OUTPATIENT)
Age: 35
End: 2020-12-06

## 2021-04-28 ENCOUNTER — MYC MEDICAL ADVICE (OUTPATIENT)
Dept: ONCOLOGY | Facility: CLINIC | Age: 36
End: 2021-04-28

## 2021-06-10 ENCOUNTER — HOSPITAL ENCOUNTER (OUTPATIENT)
Facility: CLINIC | Age: 36
Setting detail: SPECIMEN
Discharge: HOME | End: 2021-06-10
Attending: INTERNAL MEDICINE | Admitting: INTERNAL MEDICINE
Payer: COMMERCIAL

## 2021-06-10 ENCOUNTER — HOSPITAL ENCOUNTER (OUTPATIENT)
Facility: CLINIC | Age: 36
Setting detail: SPECIMEN
End: 2021-06-10
Attending: INTERNAL MEDICINE
Payer: COMMERCIAL

## 2021-06-10 DIAGNOSIS — D75.839 THROMBOCYTOSIS: ICD-10-CM

## 2021-06-10 DIAGNOSIS — D47.3 ESSENTIAL THROMBOCYTHEMIA (H): ICD-10-CM

## 2021-06-10 LAB
APTT PPP: 40 SEC (ref 22–37)
BASOPHILS # BLD AUTO: 0.1 10E9/L (ref 0–0.2)
BASOPHILS NFR BLD AUTO: 1.3 %
DIFFERENTIAL METHOD BLD: ABNORMAL
EOSINOPHIL # BLD AUTO: 0.5 10E9/L (ref 0–0.7)
EOSINOPHIL NFR BLD AUTO: 4.5 %
ERYTHROCYTE [DISTWIDTH] IN BLOOD BY AUTOMATED COUNT: 13 % (ref 10–15)
FIBRINOGEN PPP-MCNC: 232 MG/DL (ref 200–420)
HCT VFR BLD AUTO: 45.2 % (ref 40–53)
HGB BLD-MCNC: 15.1 G/DL (ref 13.3–17.7)
IMM GRANULOCYTES # BLD: 0 10E9/L (ref 0–0.4)
IMM GRANULOCYTES NFR BLD: 0.3 %
INR PPP: 1.1 (ref 0.86–1.14)
LDH SERPL L TO P-CCNC: 200 U/L (ref 85–227)
LYMPHOCYTES # BLD AUTO: 2.9 10E9/L (ref 0.8–5.3)
LYMPHOCYTES NFR BLD AUTO: 28.7 %
MCH RBC QN AUTO: 29.8 PG (ref 26.5–33)
MCHC RBC AUTO-ENTMCNC: 33.4 G/DL (ref 31.5–36.5)
MCV RBC AUTO: 89 FL (ref 78–100)
MONOCYTES # BLD AUTO: 0.9 10E9/L (ref 0–1.3)
MONOCYTES NFR BLD AUTO: 8.7 %
NEUTROPHILS # BLD AUTO: 5.8 10E9/L (ref 1.6–8.3)
NEUTROPHILS NFR BLD AUTO: 56.5 %
NRBC # BLD AUTO: 0 10*3/UL
NRBC BLD AUTO-RTO: 0 /100
PLATELET # BLD AUTO: 1372 10E9/L (ref 150–450)
RBC # BLD AUTO: 5.07 10E12/L (ref 4.4–5.9)
WBC # BLD AUTO: 10.2 10E9/L (ref 4–11)

## 2021-06-10 PROCEDURE — 36415 COLL VENOUS BLD VENIPUNCTURE: CPT

## 2021-06-10 PROCEDURE — 85610 PROTHROMBIN TIME: CPT | Performed by: INTERNAL MEDICINE

## 2021-06-10 PROCEDURE — 83615 LACTATE (LD) (LDH) ENZYME: CPT | Performed by: INTERNAL MEDICINE

## 2021-06-10 PROCEDURE — 85240 CLOT FACTOR VIII AHG 1 STAGE: CPT | Performed by: INTERNAL MEDICINE

## 2021-06-10 PROCEDURE — 85245 CLOT FACTOR VIII VW RISTOCTN: CPT | Performed by: INTERNAL MEDICINE

## 2021-06-10 PROCEDURE — 36415 COLL VENOUS BLD VENIPUNCTURE: CPT | Performed by: INTERNAL MEDICINE

## 2021-06-10 PROCEDURE — 85384 FIBRINOGEN ACTIVITY: CPT | Performed by: INTERNAL MEDICINE

## 2021-06-10 PROCEDURE — 999N001035 HC STATISTIC THROMBIN TIME NC: Performed by: INTERNAL MEDICINE

## 2021-06-10 PROCEDURE — 85025 COMPLETE CBC W/AUTO DIFF WBC: CPT | Performed by: INTERNAL MEDICINE

## 2021-06-10 PROCEDURE — 85730 THROMBOPLASTIN TIME PARTIAL: CPT | Performed by: INTERNAL MEDICINE

## 2021-06-10 PROCEDURE — 85246 CLOT FACTOR VIII VW ANTIGEN: CPT | Performed by: INTERNAL MEDICINE

## 2021-06-11 LAB
FACT VIII ACT/NOR PPP: 51 % (ref 55–200)
VWF CBA/VWF AG PPP IA-RTO: 73 % (ref 50–200)
VWF:AC ACT/NOR PPP IA: 47 % (ref 50–180)

## 2021-06-14 LAB — VWF:RCO ACT/NOR PPP PL AGG: 34 % (ref 51–215)

## 2021-06-16 ENCOUNTER — VIRTUAL VISIT (OUTPATIENT)
Dept: ONCOLOGY | Facility: CLINIC | Age: 36
End: 2021-06-16
Attending: INTERNAL MEDICINE
Payer: COMMERCIAL

## 2021-06-16 DIAGNOSIS — D75.839 THROMBOCYTOSIS: Primary | ICD-10-CM

## 2021-06-16 DIAGNOSIS — D47.3 ESSENTIAL THROMBOCYTHEMIA (H): ICD-10-CM

## 2021-06-16 PROCEDURE — 999N001193 HC VIDEO/TELEPHONE VISIT; NO CHARGE

## 2021-06-16 PROCEDURE — 99214 OFFICE O/P EST MOD 30 MIN: CPT | Mod: 95 | Performed by: INTERNAL MEDICINE

## 2021-06-16 RX ORDER — HYDROXYUREA 500 MG/1
500 CAPSULE ORAL DAILY
Qty: 30 CAPSULE | Refills: 11 | Status: SHIPPED | OUTPATIENT
Start: 2021-06-16 | End: 2021-07-16

## 2021-06-16 NOTE — LETTER
"    6/16/2021         RE: Ismael Britton  [address] [place] [address]        Dear Colleague,    Thank you for referring your patient, Ismael Britton, to the [facility]CER [facility]. Please see a copy of my visit note below.    Ismael is a 36 year old who is being evaluated via a billable video visit.      How would you like to obtain your AVS? MyChart  If the video visit is dropped, the invitation should be resent by: Send to e-mail at: [email or web address]  Will anyone else be joining your video visit? No     Vitals - Patient Reported  Weight (Patient Reported): 70.3 kg (155 lb)  Height (Patient Reported): 170.2 cm (5' 7\")  BMI (Based on Pt Reported Ht/Wt): 24.28  Pain Score: No Pain (0)    Patient was having pain at the end of May in his right lower leg (calf region).     Sujata Telles CMA June 16, 2021  1:13 PM         [facility]  HEMATOLOGY AND ONCOLOGY    FOLLOW-UP VISIT NOTE    PATIENT NAME: Ismael Britton MRN # [id number]  DATE OF VISIT: Jun 16, 2021 YOB: 1985       REFERRING PROVIDER: Leonard Murillo PA-C  [address] [place] [address]    DIAGNOSIS: Essential thrombocythemia - CALR H654Byh*47    CURRENT INTERVENTIONS:  ASA    SUBJECTIVE   Ismael Britton is being followed for essential thrombocytosis     Ismale is seen for a scheduled follow up visit for his essential thrombocytosis. He has no new complains other than calf pain for a short period of time.       PAST MEDICAL HISTORY     Past Medical History:   Diagnosis Date     Anxiety 9/2010     Asthma, mild intermittent          CURRENT OUTPATIENT MEDICATIONS     Current Outpatient Medications   Medication Sig     cetirizine (ZYRTEC) 10 MG tablet Take 1 tablet by mouth every evening.     fluticasone (FLONASE) 50 MCG/ACT nasal spray 2 sprays by Both Nostrils route daily.     No current facility-administered medications for this visit.         ALLERGIES    No Known Allergies     " REVIEW OF SYSTEMS   As above in the HPI, o/w complete 12-point ROS was negative.     PHYSICAL EXAM   There were no vitals taken for this visit.  GEN: NAD  HEENT: PERRL, EOMI, no icterus, injection or pallor. Oropharynx is clear.  LYMPHATICs: no cervical or supraclavicular lymphadenopathy; no other abn lymphadenopathy  PULMONARY: clear with good air entry bilaterally  CARDIOVASCULAR: regular, no murmurs, rubs, or gallops  GASTROINTESTINAL: soft, non-tender, non-distended, normal bowel sounds, no hepatosplenomegaly by percussion or palpation  MUSCULOSKELTAL: warm, well perfused, no edema  NEURO: awake, alert and oriented to time place and person, cranial nerves intact - II - XII, no focal neurologic deficits  SKIN: no rashes     LABORATORY AND IMAGING STUDIES     No results for input(s): NA, POTASSIUM, CHLORIDE, CO2, ANIONGAP, BUN, CR, GLC, KAHLIL in the last 85031 hours.  No results for input(s): MAG, PHOS in the last 27049 hours.  Recent Labs   Lab Test 06/10/21  0828 10/30/20  1510 10/27/20  0953 03/03/20  0912 12/09/19  0900   WBC 10.2 10.0 10.4 8.9 8.7   HGB 15.1 15.2 14.7 16.0 15.2   PLT 1,372* 1,369* 1,288* 1,080* 927*   MCV 89 90 91 87 88   NEUTROPHIL 56.5 60.4 53.4 59.7 57.1     Recent Labs   Lab Test 06/10/21  0828 10/30/20  1510 10/27/20  0953    232* 217     TSH   Date Value Ref Range Status   09/02/2010 1.28 0.4 - 5.0 mU/L Final      Results for MONALISA ARAMBULA (MRN [id number]) as of 6/16/2021 09:48   Ref. Range 10/30/2020 15:10 6/10/2021 08:28   INR Latest Ref Range: 0.86 - 1.14  1.21 (H) 1.10   PTT Latest Ref Range: 22 - 37 sec 40 (H) 40 (H)   Fibrinogen Latest Ref Range: 200 - 420 mg/dL 274 232   Factor 8 Assay Latest Ref Range: 55 - 200 % 62 51 (L)   von Willebrand Antigen Latest Ref Range: 50 - 200 % 80 73   Ristocetin Cofactor Activity Latest Ref Range: 51 - 215 % 39 (L) 34 (L)   von Willebrand Factor Activity Latest Ref Range: 50 - 180 % 54 47 (L)      ASSESSMENT AND PLAN   1. Essential  thrombocythemia - CALR L569Cxa*47 +ve; low risk disease  2. ECOG PS 0  3. No other medical comobridity    I had a lengthy discussion with Ismael, who is followed over video at this telemedicine visit.  His platelet count was normal in 09/2010 at 300,000.  His platelet counts have been markedly elevated since March 2019 and now have increased to over a million. He does have essential thrombocythemia with CALR mutation. This is usually in somatically acquired mutation.     He has low risk disease with his age under 60, lack of JAK2 mutation and no previous thrombosis. The goals of ET management are to alleviate symptoms and minimize complications of the disease (eg, thrombotic events, bleeding)  His platelet counts have been over a million for the last year and he has acquired von-Willebrands disease. This increases risk of bleeding specially with any concomitant use of ASA or NSAIDS. I would discontinue his ASA and recommend hydroxyurea with target platelet counts below 400k.     We again reviewed hydroxyurea which can be used to lower the platelet counts and decrease all of these side effects.  Hydroxyurea is an oral chemotherapeutic agent, which lowers the blood counts, including the white cells, red cells and platelets.  It will lower his platelet counts much more than his hemoglobin or his white cell count. He had questions on side effects which I reviewed including risk of secondary malignancies. Many of the listed side effects are only seen at much higher doses.      I will start him on 500 mg daily dose and follow him in a month to review how he is doing. I could have him follow up with Archana. We will gradually go up on his hydroxyurea dose to titrate up with a goal platelet counts less than 400k. I will follow him with CBC, CMP and LDH in a month.     Video-Visit Details    Type of service:  Video Visit  Originating Location (pt. Location): Home  Distant Location (provider location):  [facility]  [facility]   Platform used for Video Visit: [employer]Â®    25 minutes spent on the date of the encounter doing chart review, history and exam, documentation and further activities as noted above      Charlie Alejo    Hematologist and Medical Oncologist  [facility]           Again, thank you for allowing me to participate in the care of your patient.        Sincerely,        Charlie Alejo MD

## 2021-06-16 NOTE — PROGRESS NOTES
"Ismael is a 36 year old who is being evaluated via a billable video visit.      How would you like to obtain your AVS? MyChart  If the video visit is dropped, the invitation should be resent by: Send to e-mail at: [email or web address]  Will anyone else be joining your video visit? No     Vitals - Patient Reported  Weight (Patient Reported): 70.3 kg (155 lb)  Height (Patient Reported): 170.2 cm (5' 7\")  BMI (Based on Pt Reported Ht/Wt): 24.28  Pain Score: No Pain (0)    Patient was having pain at the end of May in his right lower leg (calf region).     Sujata Telles CMA June 16, 2021  1:13 PM         [facility]  HEMATOLOGY AND ONCOLOGY    FOLLOW-UP VISIT NOTE    PATIENT NAME: Ismael Britton MRN # [id number]  DATE OF VISIT: Jun 16, 2021 YOB: 1985       REFERRING PROVIDER: Leonard Murillo PA-C  [address] [place] [address]    DIAGNOSIS: Essential thrombocythemia - CALR [id number]*47    CURRENT INTERVENTIONS:  ASA    SUBJECTIVE   Ismael Britton is being followed for essential thrombocytosis     Ismael is seen for a scheduled follow up visit for his essential thrombocytosis. He has no new complains other than calf pain for a short period of time.       PAST MEDICAL HISTORY     Past Medical History:   Diagnosis Date     Anxiety 9/2010     Asthma, mild intermittent          CURRENT OUTPATIENT MEDICATIONS     Current Outpatient Medications   Medication Sig     cetirizine (ZYRTEC) 10 MG tablet Take 1 tablet by mouth every evening.     fluticasone (FLONASE) 50 MCG/ACT nasal spray 2 sprays by Both Nostrils route daily.     No current facility-administered medications for this visit.         ALLERGIES    No Known Allergies     REVIEW OF SYSTEMS   As above in the HPI, o/w complete 12-point ROS was negative.     PHYSICAL EXAM   There were no vitals taken for this visit.  GEN: NAD  HEENT: PERRL, EOMI, no icterus, injection or pallor. Oropharynx is clear.  LYMPHATICs: no cervical " or supraclavicular lymphadenopathy; no other abn lymphadenopathy  PULMONARY: clear with good air entry bilaterally  CARDIOVASCULAR: regular, no murmurs, rubs, or gallops  GASTROINTESTINAL: soft, non-tender, non-distended, normal bowel sounds, no hepatosplenomegaly by percussion or palpation  MUSCULOSKELTAL: warm, well perfused, no edema  NEURO: awake, alert and oriented to time place and person, cranial nerves intact - II - XII, no focal neurologic deficits  SKIN: no rashes     LABORATORY AND IMAGING STUDIES     No results for input(s): NA, POTASSIUM, CHLORIDE, CO2, ANIONGAP, BUN, CR, GLC, KAHLIL in the last 99201 hours.  No results for input(s): MAG, PHOS in the last 82432 hours.  Recent Labs   Lab Test 06/10/21  0828 10/30/20  1510 10/27/20  0953 03/03/20  0912 12/09/19  0900   WBC 10.2 10.0 10.4 8.9 8.7   HGB 15.1 15.2 14.7 16.0 15.2   PLT 1,372* 1,369* 1,288* 1,080* 927*   MCV 89 90 91 87 88   NEUTROPHIL 56.5 60.4 53.4 59.7 57.1     Recent Labs   Lab Test 06/10/21  0828 10/30/20  1510 10/27/20  0953    232* 217     TSH   Date Value Ref Range Status   09/02/2010 1.28 0.4 - 5.0 mU/L Final      Results for MONALISA ARAMBULA (MRN [id number]) as of 6/16/2021 09:48   Ref. Range 10/30/2020 15:10 6/10/2021 08:28   INR Latest Ref Range: 0.86 - 1.14  1.21 (H) 1.10   PTT Latest Ref Range: 22 - 37 sec 40 (H) 40 (H)   Fibrinogen Latest Ref Range: 200 - 420 mg/dL 274 232   Factor 8 Assay Latest Ref Range: 55 - 200 % 62 51 (L)   von Willebrand Antigen Latest Ref Range: 50 - 200 % 80 73   Ristocetin Cofactor Activity Latest Ref Range: 51 - 215 % 39 (L) 34 (L)   von Willebrand Factor Activity Latest Ref Range: 50 - 180 % 54 47 (L)      ASSESSMENT AND PLAN   1. Essential thrombocythemia - CALR [id number]*47 +ve; low risk disease  2. ECOG PS 0  3. No other medical comobridity    I had a lengthy discussion with Monalisa, who is followed over video at this telemedicine visit.  His platelet count was normal in 09/2010 at 300,000.  His  platelet counts have been markedly elevated since March 2019 and now have increased to over a million. He does have essential thrombocythemia with CALR mutation. This is usually in somatically acquired mutation.     He has low risk disease with his age under 60, lack of JAK2 mutation and no previous thrombosis. The goals of ET management are to alleviate symptoms and minimize complications of the disease (eg, thrombotic events, bleeding)  His platelet counts have been over a million for the last year and he has acquired von-Willebrands disease. This increases risk of bleeding specially with any concomitant use of ASA or NSAIDS. I would discontinue his ASA and recommend hydroxyurea with target platelet counts below 400k.     We again reviewed hydroxyurea which can be used to lower the platelet counts and decrease all of these side effects.  Hydroxyurea is an oral chemotherapeutic agent, which lowers the blood counts, including the white cells, red cells and platelets.  It will lower his platelet counts much more than his hemoglobin or his white cell count. He had questions on side effects which I reviewed including risk of secondary malignancies. Many of the listed side effects are only seen at much higher doses.      I will start him on 500 mg daily dose and follow him in a month to review how he is doing. I could have him follow up with Archana. We will gradually go up on his hydroxyurea dose to titrate up with a goal platelet counts less than 400k. I will follow him with CBC, CMP and LDH in a month.     Video-Visit Details    Type of service:  Video Visit  Originating Location (pt. Location): Home  Distant Location (provider location):  [facility]   Platform used for Video Visit: [employer]    25 minutes spent on the date of the encounter doing chart review, history and exam, documentation and further activities as noted above      Charlie Alejo    Hematologist and Medical Oncologist  [facility]

## 2021-07-16 ENCOUNTER — HOSPITAL ENCOUNTER (OUTPATIENT)
Facility: CLINIC | Age: 36
Discharge: HOME | End: 2021-07-16
Attending: PHYSICIAN ASSISTANT | Admitting: PHYSICIAN ASSISTANT
Payer: COMMERCIAL

## 2021-07-16 ENCOUNTER — LAB (OUTPATIENT)
Dept: ONCOLOGY | Facility: CLINIC | Age: 36
End: 2021-07-16
Attending: PHYSICIAN ASSISTANT
Payer: COMMERCIAL

## 2021-07-16 DIAGNOSIS — D75.839 THROMBOCYTOSIS: ICD-10-CM

## 2021-07-16 DIAGNOSIS — D47.3 ESSENTIAL THROMBOCYTHEMIA (H): ICD-10-CM

## 2021-07-16 LAB
ALBUMIN SERPL-MCNC: 4.1 G/DL (ref 3.4–5)
ALP SERPL-CCNC: 41 U/L (ref 40–150)
ALT SERPL W P-5'-P-CCNC: 51 U/L (ref 0–70)
ANION GAP SERPL CALCULATED.3IONS-SCNC: 4 MMOL/L (ref 3–14)
AST SERPL W P-5'-P-CCNC: 28 U/L (ref 0–45)
BASOPHILS # BLD AUTO: 0.1 10E3/UL (ref 0–0.2)
BASOPHILS NFR BLD AUTO: 1 %
BILIRUB SERPL-MCNC: 0.8 MG/DL (ref 0.2–1.3)
BUN SERPL-MCNC: 16 MG/DL (ref 7–30)
CALCIUM SERPL-MCNC: 9 MG/DL (ref 8.5–10.1)
CHLORIDE BLD-SCNC: 106 MMOL/L (ref 94–109)
CO2 SERPL-SCNC: 27 MMOL/L (ref 20–32)
CREAT SERPL-MCNC: 1.14 MG/DL (ref 0.66–1.25)
EOSINOPHIL # BLD AUTO: 0.3 10E3/UL (ref 0–0.7)
EOSINOPHIL NFR BLD AUTO: 2 %
ERYTHROCYTE [DISTWIDTH] IN BLOOD BY AUTOMATED COUNT: 14.7 % (ref 10–15)
GFR SERPL CREATININE-BSD FRML MDRD: 82 ML/MIN/1.73M2
GLUCOSE BLD-MCNC: 116 MG/DL (ref 70–99)
HCT VFR BLD AUTO: 44.4 % (ref 40–53)
HGB BLD-MCNC: 14.7 G/DL (ref 13.3–17.7)
IMM GRANULOCYTES # BLD: 0 10E3/UL
IMM GRANULOCYTES NFR BLD: 0 %
LDH SERPL L TO P-CCNC: 181 U/L (ref 85–227)
LYMPHOCYTES # BLD AUTO: 2.1 10E3/UL (ref 0.8–5.3)
LYMPHOCYTES NFR BLD AUTO: 18 %
MCH RBC QN AUTO: 30.3 PG (ref 26.5–33)
MCHC RBC AUTO-ENTMCNC: 33.1 G/DL (ref 31.5–36.5)
MCV RBC AUTO: 92 FL (ref 78–100)
MONOCYTES # BLD AUTO: 0.9 10E3/UL (ref 0–1.3)
MONOCYTES NFR BLD AUTO: 8 %
NEUTROPHILS # BLD AUTO: 8.1 10E3/UL (ref 1.6–8.3)
NEUTROPHILS NFR BLD AUTO: 71 %
NRBC # BLD AUTO: 0 10E3/UL
NRBC BLD AUTO-RTO: 0 /100
PLAT MORPH BLD: NORMAL
PLATELET # BLD AUTO: 1152 10E3/UL (ref 150–450)
POTASSIUM BLD-SCNC: 4.2 MMOL/L (ref 3.4–5.3)
PROT SERPL-MCNC: 7.6 G/DL (ref 6.8–8.8)
RBC # BLD AUTO: 4.85 10E6/UL (ref 4.4–5.9)
RBC MORPH BLD: NORMAL
SODIUM SERPL-SCNC: 137 MMOL/L (ref 133–144)
WBC # BLD AUTO: 11.5 10E3/UL (ref 4–11)

## 2021-07-16 PROCEDURE — 85025 COMPLETE CBC W/AUTO DIFF WBC: CPT

## 2021-07-16 PROCEDURE — 36415 COLL VENOUS BLD VENIPUNCTURE: CPT

## 2021-07-16 PROCEDURE — 80053 COMPREHEN METABOLIC PANEL: CPT

## 2021-07-16 PROCEDURE — 83615 LACTATE (LD) (LDH) ENZYME: CPT

## 2021-07-16 RX ORDER — HYDROXYUREA 500 MG/1
1000 CAPSULE ORAL DAILY
Qty: 60 CAPSULE | Refills: 3 | Status: SHIPPED | OUTPATIENT
Start: 2021-07-16 | End: 2022-01-03

## 2021-07-16 NOTE — PROGRESS NOTES
Medical Assistant Note:  Ismael Britton presents today for lab draw.    Patient seen by provider today: No.   present during visit today: Not Applicable.    Concerns: No Concerns.    Procedure:  Labs drawn: .    Post Assessment:  Labs drawn without difficulty: Yes.    Discharge Plan:  Departure Mode: Ambulatory.    Face to Face Time: 10.    Coco Riojas, [facility]

## 2021-07-16 NOTE — PROGRESS NOTES
Oncology/Hematology Visit Note    Jul 22, 2021    Reason for visit: Follow-up thrombocytosis     Oncology HPI: Ismael Britton is a 36 year old male with thrombocytosis.  He was initially diagnosed in 2019 with elevated platelet level for sinus surgery.  He established care with Dr. 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  Hydroxyurea was discussed, however patient was concerned about toxicities.  Platelet level alexx to over 1 million, therefore he was started on hydroxyurea 500 mg. Repeat labs on 7/16/21 with minimal response, therefore dose was increased to 1000mg daily.     He is here today for close follow-up and repeat labs.    Interval History: Ismael is here unaccompanied and he is doing pretty well.  He increased his dose of the hydroxyurea last week and has had some slight increased fatigue, but no other significant side effects.  He has been watching his weight and smaller portions, intentionally.  No fever or chills, vomiting or diarrhea, bleeding, headache or vision changes, chest pain or shortness of breath.     Review of Systems: See interval hx. Denies fevers, chills, HA, dizziness, n/t, changes in vision, cough, sore throat, CP, SOB, abdominal pain, N/V, diarrhea, changes in urination, bleeding, bruising, rash.     PMHx and Social Hx reviewed per EPIC.      Medications:  Current Outpatient Medications   Medication Sig Dispense Refill     cetirizine (ZYRTEC) 10 MG tablet Take 1 tablet by mouth every evening. 30 tablet 1     fluticasone (FLONASE) 50 MCG/ACT nasal spray 2 sprays by Both Nostrils route daily. 1 Package 12     hydroxyurea (HYDREA) 500 MG capsule Take 2 capsules (1,000 mg) by mouth daily 60 capsule 3       No Known Allergies      EXAM:    /76   Pulse 63   Temp 98  F (36.7  C) (Tympanic)   Resp 16   Wt 73.1 kg (161 lb 1.6 oz)   SpO2 100%   BMI 25.61 kg/m      GENERAL:  Male, in no acute distress.  Alert and oriented x3.   HEENT:  Normocephalic, atraumatic.  PERRL, oropharynx clear  with no sores or thrush.   LYMPH NODES:  No palpable pre/post-auricular, cervical, axillary lymphadenopathy appreciated.  CV:  RRR, No murmurs, gallops, or rubs.   LUNGS:  Clear to auscultation bilaterally.   ABDOMEN:  Soft, nontender and nondistended.  Bowel sounds heard x4.  No apparent hepatosplenomegaly.   EXTREMITIES:  No clubbing, cyanosis, or edema.   SKIN: No rash   PSYCH: Mood stable      Labs:   Results for MONALISA BRITTON (MRN [id number]) as of 7/22/2021 16:02   7/16/2021 14:55   Sodium 137   Potassium 4.2   Chloride 106   Carbon Dioxide 27   Urea Nitrogen 16   Creatinine 1.14   GFR Estimate 82   Calcium 9.0   Anion Gap 4   Albumin 4.1   Protein Total 7.6   Bilirubin Total 0.8   Alkaline Phosphatase 41   ALT 51   AST 28   Lactate Dehydrogenase 181   Glucose 116 (H)   WBC 11.5 (H)   Hemoglobin 14.7   Hematocrit 44.4   Platelet Count 1,152 (HH)   RBC Count 4.85   MCV 92   MCH 30.3   MCHC 33.1   RDW 14.7   % Neutrophils 71   % Lymphocytes 18   % Monocytes 8   % Eosinophils 2   Absolute Basophils 0.1   % Basophils 1   Absolute Eosinophils 0.3   Absolute Immature Granulocytes 0.0   Absolute Lymphocytes 2.1   Absolute Monocytes 0.9   % Immature Granulocytes 0   Absolute Neutrophils 8.1   Absolute NRBCs 0.0   NRBCs per 100 WBC 0   RBC Morphology Confirmed RBC Indices   Platelet Morphology Automated Count Confirmed. Platelet morphology is normal.       Imaging: n/a    Impression/Plan: Monalisa Britton is a 36 year old male with thrombocytosis, currently on hydroxyurea.    Thrombosis: Platelet level has reached over 1 million and he started hydroxyurea 500 mg, with minimal response, therefore increased to 1000 mg daily.  He is tolerating this pretty well with some slight and fatigue, but overall no other significant side effects.  Labs were not checked today since he has been on the increased dose for only 1 week, but we will recheck again in about 3 to 4 weeks.  He will call sooner if needed.      Chart  documentation with Dragon Voice recognition Software. Although reviewed after completion, some words and grammatical errors may remain.      Archana Caicedo PA-C  Hematology/Oncology  [facility]

## 2021-07-22 ENCOUNTER — ONCOLOGY VISIT (OUTPATIENT)
Dept: ONCOLOGY | Facility: CLINIC | Age: 36
End: 2021-07-22
Attending: PHYSICIAN ASSISTANT
Payer: COMMERCIAL

## 2021-07-22 VITALS
RESPIRATION RATE: 16 BRPM | SYSTOLIC BLOOD PRESSURE: 121 MMHG | DIASTOLIC BLOOD PRESSURE: 76 MMHG | HEART RATE: 63 BPM | OXYGEN SATURATION: 100 % | WEIGHT: 161.1 LBS | TEMPERATURE: 98 F | BODY MASS INDEX: 25.61 KG/M2

## 2021-07-22 DIAGNOSIS — D75.839 THROMBOCYTOSIS: Primary | ICD-10-CM

## 2021-07-22 PROCEDURE — 99214 OFFICE O/P EST MOD 30 MIN: CPT | Performed by: PHYSICIAN ASSISTANT

## 2021-07-22 PROCEDURE — G0463 HOSPITAL OUTPT CLINIC VISIT: HCPCS

## 2021-07-22 ASSESSMENT — PAIN SCALES - GENERAL: PAINLEVEL: NO PAIN (0)

## 2021-07-22 NOTE — PROGRESS NOTES
"Oncology Rooming Note    July 22, 2021 1:32 PM   Ismael Britton is a 36 year old male who presents for:    Chief Complaint   Patient presents with     Oncology Clinic Visit     Thrombocytosis     Initial Vitals: /76   Pulse 63   Temp 98  F (36.7  C) (Tympanic)   Resp 16   Wt 73.1 kg (161 lb 1.6 oz)   SpO2 100%   BMI 25.61 kg/m   Estimated body mass index is 25.61 kg/m  as calculated from the following:    Height as of 9/9/19: 1.689 m (5' 6.5\").    Weight as of this encounter: 73.1 kg (161 lb 1.6 oz). Body surface area is 1.85 meters squared.  No Pain (0) Comment: Data Unavailable   No LMP for male patient.  Allergies reviewed: Yes  Medications reviewed: Yes    Medications: Medication refills not needed today.  Pharmacy name entered into [employer]: [facility] [address] [place] [id number] [address]LE Rivera CMA              "

## 2021-07-22 NOTE — LETTER
7/22/2021         RE: Ismael Britton  [address] [facility] [address] [address]        Dear Colleague,    Thank you for referring your patient, Ismael Britton, to the [facility]. Please see a copy of my visit note below.    Oncology/Hematology Visit Note    Jul 22, 2021    Reason for visit: Follow-up thrombocytosis     Oncology HPI: Ismael Britton is a 36 year old male with thrombocytosis.  He was initially diagnosed in 2019 with elevated platelet level for sinus surgery.  He established care with Dr. 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  Hydroxyurea was discussed, however patient was concerned about toxicities.  Platelet level alexx to over 1 million, therefore he was started on hydroxyurea 500 mg. Repeat labs on 7/16/21 with minimal response, therefore dose was increased to 1000mg daily.     He is here today for close follow-up and repeat labs.    Interval History: Ismael is here unaccompanied and he is doing pretty well.  He increased his dose of the hydroxyurea last week and has had some slight increased fatigue, but no other significant side effects.  He has been watching his weight and smaller portions, intentionally.  No fever or chills, vomiting or diarrhea, bleeding, headache or vision changes, chest pain or shortness of breath.     Review of Systems: See interval hx. Denies fevers, chills, HA, dizziness, n/t, changes in vision, cough, sore throat, CP, SOB, abdominal pain, N/V, diarrhea, changes in urination, bleeding, bruising, rash.     PMHx and Social Hx reviewed per EPIC.      Medications:  Current Outpatient Medications   Medication Sig Dispense Refill     cetirizine (ZYRTEC) 10 MG tablet Take 1 tablet by mouth every evening. 30 tablet 1     fluticasone (FLONASE) 50 MCG/ACT nasal spray 2 sprays by Both Nostrils route daily. 1 Package 12     hydroxyurea (HYDREA) 500 MG capsule Take 2 capsules (1,000 mg) by mouth daily 60 capsule 3       No Known  Allergies      EXAM:    /76   Pulse 63   Temp 98  F (36.7  C) (Tympanic)   Resp 16   Wt 73.1 kg (161 lb 1.6 oz)   SpO2 100%   BMI 25.61 kg/m      GENERAL:  Male, in no acute distress.  Alert and oriented x3.   HEENT:  Normocephalic, atraumatic.  PERRL, oropharynx clear with no sores or thrush.   LYMPH NODES:  No palpable pre/post-auricular, cervical, axillary lymphadenopathy appreciated.  CV:  RRR, No murmurs, gallops, or rubs.   LUNGS:  Clear to auscultation bilaterally.   ABDOMEN:  Soft, nontender and nondistended.  Bowel sounds heard x4.  No apparent hepatosplenomegaly.   EXTREMITIES:  No clubbing, cyanosis, or edema.   SKIN: No rash   PSYCH: Mood stable      Labs:   Results for MONALISA BRITTON (MRN [id number]) as of 7/22/2021 16:02   7/16/2021 14:55   Sodium 137   Potassium 4.2   Chloride 106   Carbon Dioxide 27   Urea Nitrogen 16   Creatinine 1.14   GFR Estimate 82   Calcium 9.0   Anion Gap 4   Albumin 4.1   Protein Total 7.6   Bilirubin Total 0.8   Alkaline Phosphatase 41   ALT 51   AST 28   Lactate Dehydrogenase 181   Glucose 116 (H)   WBC 11.5 (H)   Hemoglobin 14.7   Hematocrit 44.4   Platelet Count 1,152 (HH)   RBC Count 4.85   MCV 92   MCH 30.3   MCHC 33.1   RDW 14.7   % Neutrophils 71   % Lymphocytes 18   % Monocytes 8   % Eosinophils 2   Absolute Basophils 0.1   % Basophils 1   Absolute Eosinophils 0.3   Absolute Immature Granulocytes 0.0   Absolute Lymphocytes 2.1   Absolute Monocytes 0.9   % Immature Granulocytes 0   Absolute Neutrophils 8.1   Absolute NRBCs 0.0   NRBCs per 100 WBC 0   RBC Morphology Confirmed RBC Indices   Platelet Morphology Automated Count Confirmed. Platelet morphology is normal.       Imaging: n/a    Impression/Plan: Monalisa Britton is a 36 year old male with thrombocytosis, currently on hydroxyurea.    Thrombosis: Platelet level has reached over 1 million and he started hydroxyurea 500 mg, with minimal response, therefore increased to 1000 mg daily.  He is tolerating  "this pretty well with some slight and fatigue, but overall no other significant side effects.  Labs were not checked today since he has been on the increased dose for only 1 week, but we will recheck again in about 3 to 4 weeks.  He will call sooner if needed.      Chart documentation with Dragon Voice recognition Software. Although reviewed after completion, some words and grammatical errors may remain.      Archana Caicedo PA-C  Hematology/Oncology  [facility]                  Oncology Rooming Note    July 22, 2021 1:32 PM   Ismael Britton is a 36 year old male who presents for:    Chief Complaint   Patient presents with     Oncology Clinic Visit     Thrombocytosis     Initial Vitals: /76   Pulse 63   Temp 98  F (36.7  C) (Tympanic)   Resp 16   Wt 73.1 kg (161 lb 1.6 oz)   SpO2 100%   BMI 25.61 kg/m   Estimated body mass index is 25.61 kg/m  as calculated from the following:    Height as of 9/9/19: 1.689 m (5' 6.5\").    Weight as of this encounter: 73.1 kg (161 lb 1.6 oz). Body surface area is 1.85 meters squared.  No Pain (0) Comment: Data Unavailable   No LMP for male patient.  Allergies reviewed: Yes  Medications reviewed: Yes    Medications: Medication refills not needed today.  [facility] into [facility] [id number] [address] Rivera [facility]                  Again, thank you for allowing me to participate in the care of your patient.        Sincerely,        Archana Caicedo PA-C    "

## 2021-08-19 ENCOUNTER — HOSPITAL ENCOUNTER (OUTPATIENT)
Facility: CLINIC | Age: 36
End: 2021-08-19
Attending: PHYSICIAN ASSISTANT
Payer: COMMERCIAL

## 2021-08-19 ENCOUNTER — LAB (OUTPATIENT)
Dept: ONCOLOGY | Facility: CLINIC | Age: 36
End: 2021-08-19
Attending: PHYSICIAN ASSISTANT
Payer: COMMERCIAL

## 2021-08-19 DIAGNOSIS — D47.3 ESSENTIAL THROMBOCYTHEMIA (H): ICD-10-CM

## 2021-08-19 DIAGNOSIS — D75.839 THROMBOCYTOSIS: ICD-10-CM

## 2021-08-19 LAB
APTT PPP: 39 SECONDS (ref 22–38)
BASOPHILS # BLD AUTO: 0.1 10E3/UL (ref 0–0.2)
BASOPHILS NFR BLD AUTO: 1 %
EOSINOPHIL # BLD AUTO: 0.3 10E3/UL (ref 0–0.7)
EOSINOPHIL NFR BLD AUTO: 3 %
ERYTHROCYTE [DISTWIDTH] IN BLOOD BY AUTOMATED COUNT: 17 % (ref 10–15)
FIBRINOGEN PPP-MCNC: 262 MG/DL (ref 170–490)
HCT VFR BLD AUTO: 43.7 % (ref 40–53)
HGB BLD-MCNC: 15 G/DL (ref 13.3–17.7)
IMM GRANULOCYTES # BLD: 0 10E3/UL
IMM GRANULOCYTES NFR BLD: 0 %
INR PPP: 1.12 (ref 0.85–1.15)
LDH SERPL L TO P-CCNC: 246 U/L (ref 85–227)
LYMPHOCYTES # BLD AUTO: 2 10E3/UL (ref 0.8–5.3)
LYMPHOCYTES NFR BLD AUTO: 22 %
MCH RBC QN AUTO: 31.7 PG (ref 26.5–33)
MCHC RBC AUTO-ENTMCNC: 34.3 G/DL (ref 31.5–36.5)
MCV RBC AUTO: 92 FL (ref 78–100)
MONOCYTES # BLD AUTO: 0.6 10E3/UL (ref 0–1.3)
MONOCYTES NFR BLD AUTO: 7 %
NEUTROPHILS # BLD AUTO: 6.1 10E3/UL (ref 1.6–8.3)
NEUTROPHILS NFR BLD AUTO: 67 %
NRBC # BLD AUTO: 0 10E3/UL
NRBC BLD AUTO-RTO: 0 /100
PLATELET # BLD AUTO: 970 10E3/UL (ref 150–450)
RBC # BLD AUTO: 4.73 10E6/UL (ref 4.4–5.9)
WBC # BLD AUTO: 9.3 10E3/UL (ref 4–11)

## 2021-08-19 PROCEDURE — 83615 LACTATE (LD) (LDH) ENZYME: CPT | Performed by: INTERNAL MEDICINE

## 2021-08-19 PROCEDURE — 85245 CLOT FACTOR VIII VW RISTOCTN: CPT

## 2021-08-19 PROCEDURE — 85384 FIBRINOGEN ACTIVITY: CPT | Performed by: INTERNAL MEDICINE

## 2021-08-19 PROCEDURE — 85246 CLOT FACTOR VIII VW ANTIGEN: CPT

## 2021-08-19 PROCEDURE — 85610 PROTHROMBIN TIME: CPT | Performed by: INTERNAL MEDICINE

## 2021-08-19 PROCEDURE — 85245 CLOT FACTOR VIII VW RISTOCTN: CPT | Performed by: INTERNAL MEDICINE

## 2021-08-19 PROCEDURE — 85025 COMPLETE CBC W/AUTO DIFF WBC: CPT | Performed by: INTERNAL MEDICINE

## 2021-08-19 PROCEDURE — 85730 THROMBOPLASTIN TIME PARTIAL: CPT | Performed by: INTERNAL MEDICINE

## 2021-08-19 PROCEDURE — 85240 CLOT FACTOR VIII AHG 1 STAGE: CPT

## 2021-08-19 PROCEDURE — 36415 COLL VENOUS BLD VENIPUNCTURE: CPT

## 2021-08-19 NOTE — PROGRESS NOTES
Medical Assistant Note:  Ismael S [place] presents today for blood draw.    Patient seen by provider today: No.   present during visit today: Not Applicable.    Concerns: No Concerns.    Procedure:  Labs drawn: .    Post Assessment:  Labs drawn without difficulty: Yes.    Discharge Plan:  Departure Mode: Ambulatory.    Face to Face Time: 10.    Coco Riojas, [facility]

## 2021-08-23 ENCOUNTER — ONCOLOGY VISIT (OUTPATIENT)
Dept: ONCOLOGY | Facility: CLINIC | Age: 36
End: 2021-08-23
Attending: PHYSICIAN ASSISTANT
Payer: COMMERCIAL

## 2021-08-23 VITALS
WEIGHT: 157.9 LBS | HEIGHT: 67 IN | OXYGEN SATURATION: 96 % | RESPIRATION RATE: 16 BRPM | TEMPERATURE: 98.1 F | BODY MASS INDEX: 24.78 KG/M2 | SYSTOLIC BLOOD PRESSURE: 121 MMHG | DIASTOLIC BLOOD PRESSURE: 66 MMHG | HEART RATE: 71 BPM

## 2021-08-23 DIAGNOSIS — D75.839 THROMBOCYTOSIS: Primary | ICD-10-CM

## 2021-08-23 LAB
FACT VIII ACT/NOR PPP: 67 % (ref 55–200)
VWF AG ACT/NOR PPP IA: 98 % (ref 50–200)

## 2021-08-23 PROCEDURE — 99214 OFFICE O/P EST MOD 30 MIN: CPT | Performed by: PHYSICIAN ASSISTANT

## 2021-08-23 PROCEDURE — G0463 HOSPITAL OUTPT CLINIC VISIT: HCPCS

## 2021-08-23 ASSESSMENT — MIFFLIN-ST. JEOR: SCORE: 1604.86

## 2021-08-23 ASSESSMENT — PAIN SCALES - GENERAL: PAINLEVEL: NO PAIN (0)

## 2021-08-23 NOTE — NURSING NOTE
"Oncology Rooming Note    August 23, 2021 3:32 PM   Ismael Britton is a 36 year old male who presents for:    Chief Complaint   Patient presents with     Oncology Clinic Visit     Thrombocytosis      Initial Vitals: /66   Pulse 71   Temp 98.1  F (36.7  C) (Tympanic)   Resp 16   Ht 1.702 m (5' 7\")   Wt 71.6 kg (157 lb 14.4 oz)   SpO2 96%   BMI 24.73 kg/m   Estimated body mass index is 24.73 kg/m  as calculated from the following:    Height as of this encounter: 1.702 m (5' 7\").    Weight as of this encounter: 71.6 kg (157 lb 14.4 oz). Body surface area is 1.84 meters squared.  No Pain (0) Comment: Data Unavailable   No LMP for male patient.  Allergies reviewed: Yes  Medications reviewed: Yes    Medications: Medication refills not needed today.  [facility] into [employer] [id number] [address]    Clinical concerns: follow up       Mariel Mondragon CMA              "

## 2021-08-23 NOTE — PROGRESS NOTES
"Oncology/Hematology Visit Note    Aug 23, 2021    Reason for visit: Follow-up thrombocytosis     Oncology HPI: Ismael Britton is a 36 year old male with thrombocytosis.  He was initially diagnosed in 2019 with elevated platelet level for sinus surgery.  He established care with Dr. 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  Hydroxyurea was discussed, however patient was concerned about toxicities.  Platelet level alexx to over 1 million, therefore he was started on hydroxyurea 500 mg. Repeat labs on 7/16/21 with minimal response, therefore dose was increased to 1000mg daily.     He is here today for close follow-up and repeat labs.     Interval History: Ismael is doing pretty well and continues on Hdyrea 1000mg daily. He has noticed increased fatigue since being on it. He denies f/c, v/d, bleeding.     Review of Systems: See interval hx. Denies fevers, chills, HA, dizziness, n/t, changes in vision, cough, sore throat, CP, SOB, abdominal pain, N/V, diarrhea, changes in urination, bleeding, bruising, rash.     PMHx and Social Hx reviewed per EPIC.      Medications:  Current Outpatient Medications   Medication Sig Dispense Refill     cetirizine (ZYRTEC) 10 MG tablet Take 1 tablet by mouth every evening. 30 tablet 1     fluticasone (FLONASE) 50 MCG/ACT nasal spray 2 sprays by Both Nostrils route daily. 1 Package 12     hydroxyurea (HYDREA) 500 MG capsule Take 2 capsules (1,000 mg) by mouth daily 60 capsule 3       No Known Allergies    EXAM:    /66   Pulse 71   Temp 98.1  F (36.7  C) (Tympanic)   Resp 16   Ht 1.702 m (5' 7\")   Wt 71.6 kg (157 lb 14.4 oz)   SpO2 96%   BMI 24.73 kg/m      GENERAL:  Male, in no acute distress.  Alert and oriented x3.   HEENT:  Normocephalic, atraumatic.  PERRL, oropharynx clear with no sores or thrush.   LYMPH NODES:  No palpable pre/post-auricular, cervical, axillary lymphadenopathy appreciated.  CV:  RRR, No murmurs, gallops, or rubs.   LUNGS:  Clear to auscultation bilaterally. "   ABDOMEN:  Soft, nontender and nondistended.  Bowel sounds heard x4.  No apparent hepatosplenomegaly.   EXTREMITIES:  No clubbing, cyanosis, or edema.   SKIN: No rash   PSYCH: Mood stable      Labs:   Results for MONALISA BRITTON (MRN [id number]) as of 8/23/2021 15:54   8/19/2021 15:44   WBC 9.3   Hemoglobin 15.0   Hematocrit 43.7   Platelet Count 970 (H)   RBC Count 4.73   MCV 92   MCH 31.7   MCHC 34.3   RDW 17.0 (H)   % Neutrophils 67   % Lymphocytes 22   % Monocytes 7   % Eosinophils 3   Absolute Basophils 0.1   % Basophils 1   Absolute Eosinophils 0.3   Absolute Immature Granulocytes 0.0   Absolute Lymphocytes 2.0   Absolute Monocytes 0.6   % Immature Granulocytes 0   Absolute Neutrophils 6.1   Absolute NRBCs 0.0   NRBCs per 100 WBC 0       Imaging: n/a    Impression/Plan: Monalisa Britton is a 36 year old male with thrombocytosis, currently on hydroxyurea.    Thrombosis: Platelet level had reached over 1 million and he started hydroxyurea 500 mg, with minimal response, therefore increased to 1000 mg daily.  He is tolerating this pretty well with some fatigue.  Platelets continue to improve, 970K on 8/19/2021.  Patient is concerned about financial cost in the clinic, so we will repeat labs every 3 months and he will see provider every 6 months.  He is comfortable with the plan.  He will call sooner if needed.  --12/10 Dr Alejo, repeat labs    Covid vaccine: He has not received the Covid vaccine and we encouraged him to get vaccinated.  No contraindication.      Chart documentation with Dragon Voice recognition Software. Although reviewed after completion, some words and grammatical errors may remain.      Archana Caicedo PA-C  Hematology/Oncology  [facility]

## 2021-08-23 NOTE — LETTER
"    8/23/2021         RE: Ismael Britton  [address] [address] [facility] [address]        Dear Colleague,    Thank you for referring your patient, Ismael Britton, to the [facility]. Please see a copy of my visit note below.    Oncology/Hematology Visit Note    Aug 23, 2021    Reason for visit: Follow-up thrombocytosis     Oncology HPI: Ismael Britton is a 36 year old male with thrombocytosis.  He was initially diagnosed in 2019 with elevated platelet level for sinus surgery.  He established care with Dr. 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  Hydroxyurea was discussed, however patient was concerned about toxicities.  Platelet level alexx to over 1 million, therefore he was started on hydroxyurea 500 mg. Repeat labs on 7/16/21 with minimal response, therefore dose was increased to 1000mg daily.     He is here today for close follow-up and repeat labs.     Interval History: Ismael is doing pretty well and continues on Hdyrea 1000mg daily. He has noticed increased fatigue since being on it. He denies f/c, v/d, bleeding.     Review of Systems: See interval hx. Denies fevers, chills, HA, dizziness, n/t, changes in vision, cough, sore throat, CP, SOB, abdominal pain, N/V, diarrhea, changes in urination, bleeding, bruising, rash.     PMHx and Social Hx reviewed per EPIC.      Medications:  Current Outpatient Medications   Medication Sig Dispense Refill     cetirizine (ZYRTEC) 10 MG tablet Take 1 tablet by mouth every evening. 30 tablet 1     fluticasone (FLONASE) 50 MCG/ACT nasal spray 2 sprays by Both Nostrils route daily. 1 Package 12     hydroxyurea (HYDREA) 500 MG capsule Take 2 capsules (1,000 mg) by mouth daily 60 capsule 3       No Known Allergies    EXAM:    /66   Pulse 71   Temp 98.1  F (36.7  C) (Tympanic)   Resp 16   Ht 1.702 m (5' 7\")   Wt 71.6 kg (157 lb 14.4 oz)   SpO2 96%   BMI 24.73 kg/m      GENERAL:  Male, in no acute distress.  Alert and oriented x3.   HEENT:  " Normocephalic, atraumatic.  PERRL, oropharynx clear with no sores or thrush.   LYMPH NODES:  No palpable pre/post-auricular, cervical, axillary lymphadenopathy appreciated.  CV:  RRR, No murmurs, gallops, or rubs.   LUNGS:  Clear to auscultation bilaterally.   ABDOMEN:  Soft, nontender and nondistended.  Bowel sounds heard x4.  No apparent hepatosplenomegaly.   EXTREMITIES:  No clubbing, cyanosis, or edema.   SKIN: No rash   PSYCH: Mood stable      Labs:   Results for MONALISA BRITTON (MRN [id number]) as of 8/23/2021 15:54   8/19/2021 15:44   WBC 9.3   Hemoglobin 15.0   Hematocrit 43.7   Platelet Count 970 (H)   RBC Count 4.73   MCV 92   MCH 31.7   MCHC 34.3   RDW 17.0 (H)   % Neutrophils 67   % Lymphocytes 22   % Monocytes 7   % Eosinophils 3   Absolute Basophils 0.1   % Basophils 1   Absolute Eosinophils 0.3   Absolute Immature Granulocytes 0.0   Absolute Lymphocytes 2.0   Absolute Monocytes 0.6   % Immature Granulocytes 0   Absolute Neutrophils 6.1   Absolute NRBCs 0.0   NRBCs per 100 WBC 0       Imaging: n/a    Impression/Plan: Monalisa Britton is a 36 year old male with thrombocytosis, currently on hydroxyurea.    Thrombosis: Platelet level had reached over 1 million and he started hydroxyurea 500 mg, with minimal response, therefore increased to 1000 mg daily.  He is tolerating this pretty well with some fatigue.  Platelets continue to improve, 970K on 8/19/2021.  Patient is concerned about financial cost in the clinic, so we will repeat labs every 3 months and he will see provider every 6 months.  He is comfortable with the plan.  He will call sooner if needed.  --12/10 Dr Alejo, repeat labs    Covid vaccine: He has not received the Covid vaccine and we encouraged him to get vaccinated.  No contraindication.      Chart documentation with Dragon Voice recognition Software. Although reviewed after completion, some words and grammatical errors may remain.      Archana Caicedo PA-C  Hematology/Oncology  [facility]  [facility]                    Again, thank you for allowing me to participate in the care of your patient.        Sincerely,        Archana Caicedo PA-C

## 2021-08-24 LAB — VWF:AC ACT/NOR PPP IA: 66 % (ref 50–180)

## 2021-08-26 LAB — VWF:RCO ACT/NOR PPP PL AGG: 43 %

## 2021-09-25 ENCOUNTER — HEALTH MAINTENANCE LETTER (OUTPATIENT)
Age: 36
End: 2021-09-25

## 2021-11-29 ENCOUNTER — PATIENT OUTREACH (OUTPATIENT)
Dept: ONCOLOGY | Facility: CLINIC | Age: 36
End: 2021-11-29
Payer: COMMERCIAL

## 2021-11-29 NOTE — PROGRESS NOTES
"Ismael called the clinic with concerns about the frequency of his appointments. Per Ismael, he's currently coming in every 3 months, but he's \"not on death's doorstep\" so he's wondering when he'll be able to back-off on follow-up. Ismael reports some significant stress about the financial aspect of frequent follow-up, and would prefer to have labs done every 6-12 months instead. He would like to cancel his in-person follow-up with Dr. Alejo on 12/10/21, but is willing to get his labs done that day at an outside clinic. Writer will route to Dr. Alejo and Mo, RNCC, for response.    Mireya Rausch RN, BSN, OCN on 11/29/2021 at 2:31 PM    "

## 2021-12-03 NOTE — PROGRESS NOTES
Writer discussed with Dr. Alejo regarding patient request do to finacial issues if he can have labs drawn every 3 months and have visits every 6 months with a provider. Dr. Alejo in agreement and is okay with this plan. Patient rescheduled for 3/18/22 and will get labs at outside facility prior.     Mo Scherer, RN, BSN.  RN Care Coordinator     [facility]   [phone]

## 2022-01-03 DIAGNOSIS — D47.3 ESSENTIAL THROMBOCYTHEMIA (H): ICD-10-CM

## 2022-01-03 RX ORDER — HYDROXYUREA 500 MG/1
CAPSULE ORAL
Qty: 60 CAPSULE | Refills: 1 | Status: SHIPPED | OUTPATIENT
Start: 2022-01-03 | End: 2022-06-17

## 2022-01-03 NOTE — TELEPHONE ENCOUNTER
Pending Prescriptions:                       Disp   Refills    hydroxyurea (HYDREA) 500 MG capsule [Phar*       0            Sig: Take 2 capsules by mouth once daily       Last Written Prescription Date: 7/16/21  Last Fill Quantity: 30,   # refills: 1  Last Office Visit: 8/23/21  Future Office visit:    Next 5 appointments (look out 90 days)    Jan 05, 2022  2:40 PM  (Arrive by 2:20 PM)  Provider Visit with Vitor Cee MD  [facility] ([facility] ) [address] [place] [id number] [phone]   Mar 18, 2022  8:00 AM  Return Visit with Charlie Alejo MD  [facility] ([facility] ) [address] [address] [place]  [address]  [facility] [id number] [phone]           Routing refill request to provider.     Mo Scherer, RN, BSN.  RN Care Coordinator     [facility]   [phone]

## 2022-01-05 ENCOUNTER — OFFICE VISIT (OUTPATIENT)
Dept: FAMILY MEDICINE | Facility: CLINIC | Age: 37
End: 2022-01-05
Payer: COMMERCIAL

## 2022-01-05 VITALS
HEART RATE: 77 BPM | RESPIRATION RATE: 16 BRPM | WEIGHT: 159.6 LBS | HEIGHT: 67 IN | SYSTOLIC BLOOD PRESSURE: 130 MMHG | BODY MASS INDEX: 25.05 KG/M2 | DIASTOLIC BLOOD PRESSURE: 66 MMHG | OXYGEN SATURATION: 100 % | TEMPERATURE: 98.3 F

## 2022-01-05 DIAGNOSIS — K60.2 ANAL FISSURE: Primary | ICD-10-CM

## 2022-01-05 DIAGNOSIS — Z20.822 ENCOUNTER FOR LABORATORY TESTING FOR COVID-19 VIRUS: ICD-10-CM

## 2022-01-05 DIAGNOSIS — Z28.21 COVID-19 VACCINATION DECLINED: ICD-10-CM

## 2022-01-05 PROCEDURE — 99213 OFFICE O/P EST LOW 20 MIN: CPT | Performed by: FAMILY MEDICINE

## 2022-01-05 ASSESSMENT — MIFFLIN-ST. JEOR: SCORE: 1612.57

## 2022-01-05 NOTE — PROGRESS NOTES
"  Assessment & Plan   Problem List Items Addressed This Visit     Anal fissure - Primary     Occasional blood with wiping, occasional pain, occasional lumps.  Exam today shows a midline posterior anal fissure with a midline anterior pile.  Refer natural history discussed         Relevant Orders    Colorectal Surgery Referral    COVID-19 vaccination declined     Offered, he declines with vigor         Encounter for laboratory testing for COVID-19 virus     Work requires a negative Covid test before travel on a specific date         Relevant Orders    Asymptomatic COVID-19 Virus (Coronavirus) by PCR Nose                  BMI:   Estimated body mass index is 25 kg/m  as calculated from the following:    Height as of this encounter: 1.702 m (5' 7\").    Weight as of this encounter: 72.4 kg (159 lb 9.6 oz).     nl    No follow-ups on file.    Vitor Cee MD  [facility]    Tiffanie Guevara is a 36 year old who presents for the following health issues     History of Present Illness       He eats 0-1 servings of fruits and vegetables daily.He consumes 0 sweetened beverage(s) daily.He exercises with enough effort to increase his heart rate 10 to 19 minutes per day.  He exercises with enough effort to increase his heart rate 4 days per week.   He is taking medications regularly.       Hemorrhoids  Onset/Duration: couple years  Description:   Lisa-anal lump: no  Pain: YES  Itching: YES  Accompanying Signs & Symptoms:  Blood in stool: not in stool but having blood on tissue from from them  Changes in stool pattern: no  History:   Any previous GI studies done:none  Family History of colon cancer: no  Precipitating factors:   None  Alleviating factors:  Is using suppositories at night which seems to help with the itching but it does not help with the external part of it.   Therapies tried and outcome: preparation H and stool softeners, generic OTC. Ducsolate.         Review of Systems   No GI " "symptoms no rash      Objective    /66 (BP Location: Right arm, Patient Position: Sitting, Cuff Size: Adult Regular)   Pulse 77   Temp 98.3  F (36.8  C) (Oral)   Resp 16   Ht 1.702 m (5' 7\")   Wt 72.4 kg (159 lb 9.6 oz)   SpO2 100%   BMI 25.00 kg/m    Body mass index is 25 kg/m .  Physical Exam     As described  Vitor Cee MD              "

## 2022-01-06 ENCOUNTER — TRANSFERRED RECORDS (OUTPATIENT)
Dept: HEALTH INFORMATION MANAGEMENT | Facility: CLINIC | Age: 37
End: 2022-01-06
Payer: COMMERCIAL

## 2022-01-06 NOTE — ASSESSMENT & PLAN NOTE
Occasional blood with wiping, occasional pain, occasional lumps.  Exam today shows a midline posterior anal fissure with a midline anterior pile.  Refer natural history discussed

## 2022-01-15 ENCOUNTER — HEALTH MAINTENANCE LETTER (OUTPATIENT)
Age: 37
End: 2022-01-15

## 2022-03-14 ENCOUNTER — DOCUMENTATION ONLY (OUTPATIENT)
Dept: LAB | Facility: CLINIC | Age: 37
End: 2022-03-14

## 2022-03-14 NOTE — PROGRESS NOTES
Pt coming in for labs 3/15/22. Apt notes state for Dr. Alejo. Currently no lab orders in there for Dr. Alejo, BUT Dr. Cee has ordered some.   Please review and if you need any additional labs please place orders.    Thank You  Talya BENITEZ

## 2022-03-15 ENCOUNTER — PATIENT OUTREACH (OUTPATIENT)
Dept: CARE COORDINATION | Facility: CLINIC | Age: 37
End: 2022-03-15
Payer: COMMERCIAL

## 2022-03-15 NOTE — PROGRESS NOTES
Social Work Progress Note      Data/Intervention:  Patient Name:  Ismael Britton  /Age:  1985 (37 year old)    Reason for Follow-Up:  Ismael is a 37-year-old gentleman with a diagnosis of thrombocytosis who is followed by Dr. Alejo at [facility]. This clinician received referral from TYLOR Hassan for financial assistance.     Intervention:   Ismael reports that he is a  gentleman who is the proud father of 4 girls. His family of 6 recently moved into a new home, and he is concerned about keeping medical costs low due to change in monthly expenditures. Ismael reports that he is on a high deductible plan at present time, and that coming into clinic with significant frequency is financially difficult.     This clinician reviewed options of:  1) Exploring eligibility for Medicaid & spending time during Open Enrollment to ensure that he has a plan that is good fit for medical need in the coming year  2) Setting up payment plan with [place]  3) Exploring eligibility for [facility] Care.     Ismael reported appreciation of social work outreach, but reports that he anticipates that he will not be eligible for Medicaid or [employer], and already has a payment plan set up.     Ismael reports that it is his preference to see Dr. Alejo once a year, with lab draw every 6 months. SW sent pt's preferences to Dr. Alejo for his recommendation on appointment frequency.    Plan:  1) This clinician to send Ismael MyChart 3/16/2022 with information regarding Medicaid eligibility, [employer] & Health insurance support.   2) Appreciate Mo's following up with pt regarding frequency of future visits with Dr. Alejo.   3) Hem/Onc  will continue to be available as needed for ongoing psychosocial support.     Please call or page if needs or concerns arise.     REGINA Alvarado, LICSW  Direct Phone: [phone]  Pager: [phone]

## 2022-03-16 ENCOUNTER — LAB (OUTPATIENT)
Dept: LAB | Facility: CLINIC | Age: 37
End: 2022-03-16
Payer: COMMERCIAL

## 2022-03-16 DIAGNOSIS — Z11.59 NEED FOR HEPATITIS C SCREENING TEST: ICD-10-CM

## 2022-03-16 DIAGNOSIS — Z11.4 SCREENING FOR HIV (HUMAN IMMUNODEFICIENCY VIRUS): ICD-10-CM

## 2022-03-16 DIAGNOSIS — Z13.220 SCREENING FOR HYPERLIPIDEMIA: ICD-10-CM

## 2022-03-16 PROCEDURE — 86803 HEPATITIS C AB TEST: CPT

## 2022-03-16 PROCEDURE — 80061 LIPID PANEL: CPT

## 2022-03-16 PROCEDURE — 87389 HIV-1 AG W/HIV-1&-2 AB AG IA: CPT

## 2022-03-16 PROCEDURE — 36415 COLL VENOUS BLD VENIPUNCTURE: CPT

## 2022-03-17 LAB
CHOLEST SERPL-MCNC: 186 MG/DL
FASTING STATUS PATIENT QL REPORTED: NO
HCV AB SERPL QL IA: NONREACTIVE
HDLC SERPL-MCNC: 60 MG/DL
HIV 1+2 AB+HIV1 P24 AG SERPL QL IA: NONREACTIVE
LDLC SERPL CALC-MCNC: 98 MG/DL
NONHDLC SERPL-MCNC: 126 MG/DL
TRIGL SERPL-MCNC: 142 MG/DL

## 2022-06-17 DIAGNOSIS — D47.3 ESSENTIAL THROMBOCYTHEMIA (H): ICD-10-CM

## 2022-06-17 NOTE — TELEPHONE ENCOUNTER
Pending Prescriptions:                       Disp   Refills    hydroxyurea (HYDREA) 500 MG capsule [Phar*       0            Sig: Take 2 capsules by mouth once daily      Last Written Prescription Date: 1/3/22  Last Fill Quantity: 60,   # refills: 1  Last Office Visit: 8/23/21  Future Office visit: TBT    Routing refill request to provider.     Mo Scherer, RN, BSN.  RN Care Coordinator     [facility]   [phone]

## 2022-06-20 RX ORDER — HYDROXYUREA 500 MG/1
CAPSULE ORAL
Qty: 60 CAPSULE | Refills: 1 | Status: SHIPPED | OUTPATIENT
Start: 2022-06-20 | End: 2023-04-06

## 2022-06-20 NOTE — TELEPHONE ENCOUNTER
Signed Prescriptions:                        Disp   Refills    hydroxyurea (HYDREA) 500 MG capsule        60 cap*1        Sig: Take 2 capsules by mouth once daily  Authorizing Provider: JOHN ANDINO, RN, BSN, OCN  Nurse Care Coordinator  [facility]  P: [phone]     F: [phone]

## 2022-08-01 ENCOUNTER — TELEPHONE (OUTPATIENT)
Dept: FAMILY MEDICINE | Facility: CLINIC | Age: 37
End: 2022-08-01

## 2022-08-01 NOTE — TELEPHONE ENCOUNTER
Patient Quality Outreach    Patient is due for the following:   Asthma  -  ACT needed    NEXT STEPS:   No follow up needed at this time.    Type of outreach:    Chart review performed, no outreach needed.      Questions for provider review:    None     Debbie Oswald, CMA

## 2022-08-03 ENCOUNTER — TELEPHONE (OUTPATIENT)
Dept: FAMILY MEDICINE | Facility: CLINIC | Age: 37
End: 2022-08-03

## 2022-08-03 NOTE — TELEPHONE ENCOUNTER
Patient Quality Outreach    Patient is due for the following:   Asthma  -  ACT needed  Physical Preventive Adult Physical    Next Steps:   Schedule a yearly physical    Type of outreach:    Sent [employer] message.    Next Steps:  Reach out within 90 days via Letter.    Max number of attempts reached: No. Will try again in 90 days if patient still on fail list.    Questions for provider review:    None     Debbie Khoury CMA  Chart routed to self.

## 2022-10-17 NOTE — TELEPHONE ENCOUNTER
Pending Prescriptions:                       Disp   Refills    hydroxyurea (HYDREA) 500 MG capsule       60 cap*11           Sig: Take 2 capsules (1,000 mg) by mouth daily          Last Written Prescription Date:  6/16/2021  Last Fill Quantity: 30,   # refills: 11  Last Office Visit: 6/16/2021  Future Office visit:    Next 5 appointments (look out 90 days)    Jul 22, 2021  1:30 PM  Return Visit with Archana Kolb PA-C  [facility] [address]  [facility] [address]  [phone]           Routing refill request to provider for review/approval   Akilah Hall RN on 7/16/2021 at 4:55 PM    
Lauro Castaneda(Attending)

## 2022-12-01 ENCOUNTER — TRANSFERRED RECORDS (OUTPATIENT)
Dept: HEALTH INFORMATION MANAGEMENT | Facility: CLINIC | Age: 37
End: 2022-12-01

## 2023-03-30 DIAGNOSIS — D47.3 ESSENTIAL THROMBOCYTHEMIA (H): Primary | ICD-10-CM

## 2023-04-06 ENCOUNTER — LAB (OUTPATIENT)
Dept: LAB | Facility: CLINIC | Age: 38
End: 2023-04-06
Payer: COMMERCIAL

## 2023-04-06 ENCOUNTER — PATIENT OUTREACH (OUTPATIENT)
Dept: ONCOLOGY | Facility: CLINIC | Age: 38
End: 2023-04-06

## 2023-04-06 DIAGNOSIS — D47.3 ESSENTIAL THROMBOCYTHEMIA (H): ICD-10-CM

## 2023-04-06 LAB
ALBUMIN SERPL BCG-MCNC: 4.5 G/DL (ref 3.5–5.2)
ALP SERPL-CCNC: 36 U/L (ref 40–129)
ALT SERPL W P-5'-P-CCNC: 52 U/L (ref 10–50)
ANION GAP SERPL CALCULATED.3IONS-SCNC: 14 MMOL/L (ref 7–15)
AST SERPL W P-5'-P-CCNC: 42 U/L (ref 10–50)
BASOPHILS # BLD AUTO: 0.2 10E3/UL (ref 0–0.2)
BASOPHILS NFR BLD AUTO: 1 %
BILIRUB SERPL-MCNC: 0.6 MG/DL
BUN SERPL-MCNC: 21.2 MG/DL (ref 6–20)
CALCIUM SERPL-MCNC: 9.5 MG/DL (ref 8.6–10)
CHLORIDE SERPL-SCNC: 103 MMOL/L (ref 98–107)
CREAT SERPL-MCNC: 1.3 MG/DL (ref 0.67–1.17)
DEPRECATED HCO3 PLAS-SCNC: 22 MMOL/L (ref 22–29)
EOSINOPHIL # BLD AUTO: 0.5 10E3/UL (ref 0–0.7)
EOSINOPHIL NFR BLD AUTO: 3 %
ERYTHROCYTE [DISTWIDTH] IN BLOOD BY AUTOMATED COUNT: 14.2 % (ref 10–15)
GFR SERPL CREATININE-BSD FRML MDRD: 72 ML/MIN/1.73M2
GLUCOSE SERPL-MCNC: 81 MG/DL (ref 70–99)
HCT VFR BLD AUTO: 45.3 % (ref 40–53)
HGB BLD-MCNC: 14.7 G/DL (ref 13.3–17.7)
IMM GRANULOCYTES # BLD: 0.1 10E3/UL
IMM GRANULOCYTES NFR BLD: 1 %
LDH SERPL L TO P-CCNC: 267 U/L (ref 0–250)
LYMPHOCYTES # BLD AUTO: 3 10E3/UL (ref 0.8–5.3)
LYMPHOCYTES NFR BLD AUTO: 19 %
MCH RBC QN AUTO: 30.3 PG (ref 26.5–33)
MCHC RBC AUTO-ENTMCNC: 32.5 G/DL (ref 31.5–36.5)
MCV RBC AUTO: 93 FL (ref 78–100)
MONOCYTES # BLD AUTO: 1.2 10E3/UL (ref 0–1.3)
MONOCYTES NFR BLD AUTO: 8 %
NEUTROPHILS # BLD AUTO: 10.8 10E3/UL (ref 1.6–8.3)
NEUTROPHILS NFR BLD AUTO: 68 %
NRBC # BLD AUTO: 0 10E3/UL
NRBC BLD AUTO-RTO: 0 /100
PLAT MORPH BLD: NORMAL
PLATELET # BLD AUTO: 1545 10E3/UL (ref 150–450)
POTASSIUM SERPL-SCNC: 4.5 MMOL/L (ref 3.4–5.3)
PROT SERPL-MCNC: 7.1 G/DL (ref 6.4–8.3)
RBC # BLD AUTO: 4.85 10E6/UL (ref 4.4–5.9)
RBC MORPH BLD: NORMAL
SODIUM SERPL-SCNC: 139 MMOL/L (ref 136–145)
WBC # BLD AUTO: 15.7 10E3/UL (ref 4–11)

## 2023-04-06 PROCEDURE — 80053 COMPREHEN METABOLIC PANEL: CPT

## 2023-04-06 PROCEDURE — 83615 LACTATE (LD) (LDH) ENZYME: CPT

## 2023-04-06 PROCEDURE — 36415 COLL VENOUS BLD VENIPUNCTURE: CPT

## 2023-04-06 PROCEDURE — 85025 COMPLETE CBC W/AUTO DIFF WBC: CPT

## 2023-04-06 NOTE — PROGRESS NOTES
Debbie Moreno DO Haack, Corrie Beth, RN  Cc: Charlie Alejo MD; Mo Scherer, RN; Billmark, Radha, RN  Caller: Unspecified (Today,  2:56 PM)  Last clinic note I can see is from 6/2021. Patient has had higher platelets counts in the past.     Per last clinic note, he should be on hydrea 500 mg daily. The last time it was filled in 6/2022 was for a dose of hydrea 1000 mg daily.     Please confirm if he is still taking hydrea. If he has symptoms of headache, dizziness, vision change, or if he is having any bleed.     If yes to symptoms, then go to ED.     If no to symptoms, then it looks like he has a clinic visit with Dr. Alejo on 4/12. Dr. Alejo can resume hydrea then if he is not taking it or increase dosing if he is taking it.     Thank you       Called patient to find out if he has been taking the Hydroxyurea.  Pt said he stopped taking it all together for 6 months.  He said he started taking the Hydroxyurea again last month (for the last 30 days), but he has only been taking 500mg.  Pt said he is feeling great, denies headache, dizziness, vision changes, denies bleeding.  Advised pt to take the 1,000mg Hydroxyurea as prescribed by Dr. Alejo and to follow up with him as scheduled on 4/12/23.  Pt voiced understanding and will plan to do that.  He said he is almost out of his Hydroxyurea medication if he needs to take 2 pills a day (1,000mg).  He'd like a new prescription sent in to his [facility] pharmacy in [place].  Writer will route message to  Dr. Moreno to see if she'd be willing to send in prescription for patient.    Debbie Moreno, DO  You; Mo Scherer, RN; Charlie Alejo MD 14 minutes ago (3:54 PM)     REDDY Hassan, please forward to Dr. Alejo to fill.       Writer will send to Dr. Alejo to fill.    Jodi Lo, EBONIE, BSN    Triage Nurse Advisor  [facility]  [phone]

## 2023-04-06 NOTE — TELEPHONE ENCOUNTER
Signed Prescriptions:                        Disp   Refills    hydroxyurea (HYDREA) 500 MG capsule        60 cap*11       Sig: Take 2 capsules (1,000 mg) by mouth daily  Authorizing Provider: JOHN ANDINO    Hydroxyurea (Hydrea) 500 MG capsules  Last Written Prescription Date:  6/20/22  Last Fill Quantity: 60,   # refills: 1  Last Office Visit: 8/23/21  Future Office visit:  4/12/23    Routing refill request to provider for review/approval.    Jodi Lo, RN, BSN    Triage Nurse Advisor  [facility]  [phone]

## 2023-04-06 NOTE — PROGRESS NOTES
DATE:  4/6/2023   TIME OF RECEIPT FROM LAB:  2:55pm  LAB TEST:  CBC w/Plt  LAB VALUE:  plt 1,704  RESULTS GIVEN WITH READ-BACK TO (PROVIDER):  Dr. Debbie Moreno MD  TIME LAB VALUE REPORTED TO PROVIDER:   2:57pm    Jodi Lo RN, BSN    Triage Nurse Advisor  [facility]  [phone]

## 2023-04-08 RX ORDER — HYDROXYUREA 500 MG/1
1000 CAPSULE ORAL DAILY
Qty: 60 CAPSULE | Refills: 11 | Status: SHIPPED | OUTPATIENT
Start: 2023-04-08 | End: 2023-04-12

## 2023-04-12 ENCOUNTER — VIRTUAL VISIT (OUTPATIENT)
Dept: ONCOLOGY | Facility: CLINIC | Age: 38
End: 2023-04-12
Attending: INTERNAL MEDICINE
Payer: COMMERCIAL

## 2023-04-12 DIAGNOSIS — D47.3 ESSENTIAL THROMBOCYTHEMIA (H): ICD-10-CM

## 2023-04-12 PROCEDURE — 99213 OFFICE O/P EST LOW 20 MIN: CPT | Mod: VID | Performed by: INTERNAL MEDICINE

## 2023-04-12 RX ORDER — HYDROXYUREA 500 MG/1
1000 CAPSULE ORAL DAILY
Qty: 180 CAPSULE | Refills: 3 | Status: SHIPPED | OUTPATIENT
Start: 2023-04-12 | End: 2023-10-11

## 2023-04-12 NOTE — PROGRESS NOTES
Virtual Visit Details    Type of service:  Video Visit     Originating Location (pt. Location): Home    Distant Location (provider location):  Off-site  Platform used for Video Visit: Fartun

## 2023-04-12 NOTE — LETTER
4/12/2023         RE: Ismael Britton  [address] [place] [address]        Dear Colleague,    Thank you for referring your patient, Ismael Britton, to the [facility]. Please see a copy of my visit note below.    Virtual Visit Details    Type of service:  Video Visit     Originating Location (pt. Location): Home    Distant Location (provider location):  Off-site  Platform used for Video Visit: [facility]  HEMATOLOGY AND ONCOLOGY    FOLLOW-UP VISIT NOTE    PATIENT NAME: Ismael Britton MRN # [id number]  DATE OF VISIT: Apr 12, 2023 YOB: 1985       REFERRING PROVIDER: Leonard Murillo PA-C  [address] [place] [address]    DIAGNOSIS: Essential thrombocythemia - CALR [id number]*47    CURRENT INTERVENTIONS:  ASA  Hydroxyurea restarted for 2 weeks    SUBJECTIVE   Ismael Britton is being followed for essential thrombocytosis     Ismael is seen for a scheduled follow up visit for his essential thrombocytosis. He has no new complains. He had discontinued his hydroxyurea. He has just restarted it 2 weeks ago. He is being seen with labs over a video visit.       PAST MEDICAL HISTORY     Past Medical History:   Diagnosis Date    Anxiety 9/2010    Asthma, mild intermittent     COVID-19 vaccination declined 1/5/2022         CURRENT OUTPATIENT MEDICATIONS     Current Outpatient Medications   Medication Sig    cetirizine (ZYRTEC) 10 MG tablet Take 1 tablet by mouth every evening.    fluticasone (FLONASE) 50 MCG/ACT nasal spray 2 sprays by Both Nostrils route daily.    hydroxyurea (HYDREA) 500 MG capsule Take 2 capsules (1,000 mg) by mouth daily     No current facility-administered medications for this visit.        ALLERGIES    No Known Allergies     REVIEW OF SYSTEMS   As above in the HPI, o/w complete 12-point ROS was negative.     PHYSICAL EXAM   There were no vitals taken for this visit.  GEN: NAD  HEENT: PERRL, EOMI, no icterus,  injection or pallor. Oropharynx is clear.  LYMPHATICs: no cervical or supraclavicular lymphadenopathy; no other abn lymphadenopathy  PULMONARY: clear with good air entry bilaterally  CARDIOVASCULAR: regular, no murmurs, rubs, or gallops  GASTROINTESTINAL: soft, non-tender, non-distended, normal bowel sounds, no hepatosplenomegaly by percussion or palpation  MUSCULOSKELTAL: warm, well perfused, no edema  NEURO: awake, alert and oriented to time place and person, cranial nerves intact - II - XII, no focal neurologic deficits  SKIN: no rashes     LABORATORY AND IMAGING STUDIES     Recent Labs   Lab Test 04/06/23  1446 07/16/21  1455    137   POTASSIUM 4.5 4.2   CHLORIDE 103 106   CO2 22 27   ANIONGAP 14 4   BUN 21.2* 16   CR 1.30* 1.14   GLC 81 116*   KAHLIL 9.5 9.0     No results for input(s): MAG, PHOS in the last 03155 hours.  Recent Labs   Lab Test 04/06/23  1446 08/19/21  1544 07/16/21  1455 06/10/21  0828 10/30/20  1510   WBC 15.7* 9.3 11.5* 10.2 10.0   HGB 14.7 15.0 14.7 15.1 15.2   PLT 1,545* 970* 1,152* 1,372* 1,369*   MCV 93 92 92 89 90   NEUTROPHIL 68 67 71 56.5 60.4     Recent Labs   Lab Test 04/06/23  1446 08/19/21  1543 07/16/21  1455   BILITOTAL 0.6  --  0.8   ALKPHOS 36*  --  41   ALT 52*  --  51   AST 42  --  28   ALBUMIN 4.5  --  4.1   * 246* 181     TSH   Date Value Ref Range Status   09/02/2010 1.28 0.4 - 5.0 mU/L Final     No results for input(s): CEA in the last 24326 hours.  No results found for this or any previous visit.  Recent Labs   Lab Test 04/06/23  1446 08/19/21  1544 07/16/21  1455 06/10/21  0828 10/30/20  1510   PLT 1,545* 970* 1,152* 1,372* 1,369*           ASSESSMENT AND PLAN   Essential thrombocythemia - CALR [id number]*47 +ve; low risk disease  ECOG PS 0  No other medical comobridity    I had a lengthy discussion with Ismael, who is followed over video at this telemedicine visit.  His platelet count was normal in 09/2010 at 300,000.  His platelet counts have been markedly  elevated since March 2019 and now have increased to over a million. He does have essential thrombocythemia with CALR mutation. This is usually in somatically acquired mutation.     He has low risk disease with his age under 60, lack of JAK2 mutation and no previous thrombosis. The goals of ET management are to alleviate symptoms and minimize complications of the disease (eg, thrombotic events, bleeding)  His platelet counts have been over 1.5 million while off therapy and he has acquired von-Willebrands disease. This increases risk of bleeding specially with any concomitant use of ASA or NSAIDS. I have discontinued his ASA and recommend hydroxyurea with target platelet counts below 400k.     We again reviewed hydroxyurea which can be used to lower the platelet counts and decrease all of these side effects.  Hydroxyurea is an oral chemotherapeutic agent, which lowers the blood counts, including the white cells, red cells and platelets.  It will lower his platelet counts much more than his hemoglobin or his white cell count.     He has restarted on 1000 mg daily dose. In the past this was the tolerated dose for him. He has a huge expense from our follow up visits. I will get labs every 3 months  and follow him in 6 month once to review how he is doing and then move the follow ups to annual. I will follow him with CBC, CMP and LDH in 3 months.     Video-Visit Details    Type of service:  Video Visit  Originating Location (pt. Location): Home  Distant Location (provider location):  [facility] [place]   Platform used for Video Visit: [employer]    15 minutes spent on the date of the encounter doing chart review, history and exam, documentation and further activities as noted above      Charlie Alejo    Hematologist and Medical Oncologist  [facility]

## 2023-04-12 NOTE — PROGRESS NOTES
[facility]  HEMATOLOGY AND ONCOLOGY    FOLLOW-UP VISIT NOTE    PATIENT NAME: Ismael Britton MRN # [id number]  DATE OF VISIT: Apr 12, 2023 YOB: 1985       REFERRING PROVIDER: Leonard Murillo PA-C  [address] [place] [address]    DIAGNOSIS: Essential thrombocythemia - CALR [id number]*47    CURRENT INTERVENTIONS:  ASA  Hydroxyurea restarted for 2 weeks    SUBJECTIVE   Ismael Britton is being followed for essential thrombocytosis     Ismael is seen for a scheduled follow up visit for his essential thrombocytosis. He has no new complains. He had discontinued his hydroxyurea. He has just restarted it 2 weeks ago. He is being seen with labs over a video visit.       PAST MEDICAL HISTORY     Past Medical History:   Diagnosis Date     Anxiety 9/2010     Asthma, mild intermittent      COVID-19 vaccination declined 1/5/2022         CURRENT OUTPATIENT MEDICATIONS     Current Outpatient Medications   Medication Sig     cetirizine (ZYRTEC) 10 MG tablet Take 1 tablet by mouth every evening.     fluticasone (FLONASE) 50 MCG/ACT nasal spray 2 sprays by Both Nostrils route daily.     hydroxyurea (HYDREA) 500 MG capsule Take 2 capsules (1,000 mg) by mouth daily     No current facility-administered medications for this visit.        ALLERGIES    No Known Allergies     REVIEW OF SYSTEMS   As above in the HPI, o/w complete 12-point ROS was negative.     PHYSICAL EXAM   There were no vitals taken for this visit.  GEN: NAD  HEENT: PERRL, EOMI, no icterus, injection or pallor. Oropharynx is clear.  LYMPHATICs: no cervical or supraclavicular lymphadenopathy; no other abn lymphadenopathy  PULMONARY: clear with good air entry bilaterally  CARDIOVASCULAR: regular, no murmurs, rubs, or gallops  GASTROINTESTINAL: soft, non-tender, non-distended, normal bowel sounds, no hepatosplenomegaly by percussion or palpation  MUSCULOSKELTAL: warm, well perfused, no edema  NEURO: awake, alert and oriented to time  place and person, cranial nerves intact - II - XII, no focal neurologic deficits  SKIN: no rashes     LABORATORY AND IMAGING STUDIES     Recent Labs   Lab Test 04/06/23  1446 07/16/21  1455    137   POTASSIUM 4.5 4.2   CHLORIDE 103 106   CO2 22 27   ANIONGAP 14 4   BUN 21.2* 16   CR 1.30* 1.14   GLC 81 116*   KAHLIL 9.5 9.0     No results for input(s): MAG, PHOS in the last 59722 hours.  Recent Labs   Lab Test 04/06/23  1446 08/19/21  1544 07/16/21  1455 06/10/21  0828 10/30/20  1510   WBC 15.7* 9.3 11.5* 10.2 10.0   HGB 14.7 15.0 14.7 15.1 15.2   PLT 1,545* 970* 1,152* 1,372* 1,369*   MCV 93 92 92 89 90   NEUTROPHIL 68 67 71 56.5 60.4     Recent Labs   Lab Test 04/06/23  1446 08/19/21  1543 07/16/21  1455   BILITOTAL 0.6  --  0.8   ALKPHOS 36*  --  41   ALT 52*  --  51   AST 42  --  28   ALBUMIN 4.5  --  4.1   * 246* 181     TSH   Date Value Ref Range Status   09/02/2010 1.28 0.4 - 5.0 mU/L Final     No results for input(s): CEA in the last 66612 hours.  No results found for this or any previous visit.  Recent Labs   Lab Test 04/06/23  1446 08/19/21  1544 07/16/21  1455 06/10/21  0828 10/30/20  1510   PLT 1,545* 970* 1,152* 1,372* 1,369*           ASSESSMENT AND PLAN   1. Essential thrombocythemia - CALR [id number]*47 +ve; low risk disease  2. ECOG PS 0  3. No other medical comobridity    I had a lengthy discussion with Ismael, who is followed over video at this telemedicine visit.  His platelet count was normal in 09/2010 at 300,000.  His platelet counts have been markedly elevated since March 2019 and now have increased to over a million. He does have essential thrombocythemia with CALR mutation. This is usually in somatically acquired mutation.     He has low risk disease with his age under 60, lack of JAK2 mutation and no previous thrombosis. The goals of ET management are to alleviate symptoms and minimize complications of the disease (eg, thrombotic events, bleeding)  His platelet counts have been over  1.5 million while off therapy and he has acquired von-Willebrands disease. This increases risk of bleeding specially with any concomitant use of ASA or NSAIDS. I have discontinued his ASA and recommend hydroxyurea with target platelet counts below 400k.     We again reviewed hydroxyurea which can be used to lower the platelet counts and decrease all of these side effects.  Hydroxyurea is an oral chemotherapeutic agent, which lowers the blood counts, including the white cells, red cells and platelets.  It will lower his platelet counts much more than his hemoglobin or his white cell count.     He has restarted on 1000 mg daily dose. In the past this was the tolerated dose for him. He has a huge expense from our follow up visits. I will get labs every 3 months  and follow him in 6 month once to review how he is doing and then move the follow ups to annual. I will follow him with CBC, CMP and LDH in 3 months.     Video-Visit Details    Type of service:  Video Visit  Originating Location (pt. Location): Home  Distant Location (provider location):  [facility] [place]   Platform used for Video Visit: Theorem    15 minutes spent on the date of the encounter doing chart review, history and exam, documentation and further activities as noted above      Charlie Alejo    Hematologist and Medical Oncologist  [facility]

## 2023-04-12 NOTE — NURSING NOTE
Is the patient currently in the state of [place]? YES    Visit mode:VIDEO    If the visit is dropped, the patient can be reconnected by: VIDEO VISIT: Text to cell phone: [phone]    Will anyone else be joining the visit? NO      How would you like to obtain your AVS? MyChart    Are changes needed to the allergy or medication list? NO  Angela Berry    Reason for visit: Follow up

## 2023-04-22 ENCOUNTER — HEALTH MAINTENANCE LETTER (OUTPATIENT)
Age: 38
End: 2023-04-22

## 2023-07-10 ENCOUNTER — TELEPHONE (OUTPATIENT)
Dept: ONCOLOGY | Facility: CLINIC | Age: 38
End: 2023-07-10

## 2023-07-10 ENCOUNTER — LAB (OUTPATIENT)
Dept: LAB | Facility: CLINIC | Age: 38
End: 2023-07-10
Attending: INTERNAL MEDICINE
Payer: COMMERCIAL

## 2023-07-10 DIAGNOSIS — D47.3 ESSENTIAL THROMBOCYTHEMIA (H): ICD-10-CM

## 2023-07-10 LAB
ALBUMIN SERPL BCG-MCNC: 4.8 G/DL (ref 3.5–5.2)
ALP SERPL-CCNC: 36 U/L (ref 40–129)
ALT SERPL W P-5'-P-CCNC: 26 U/L (ref 0–70)
ANION GAP SERPL CALCULATED.3IONS-SCNC: 10 MMOL/L (ref 7–15)
AST SERPL W P-5'-P-CCNC: 39 U/L (ref 0–45)
BASOPHILS # BLD AUTO: 0.1 10E3/UL (ref 0–0.2)
BASOPHILS NFR BLD AUTO: 1 %
BILIRUB SERPL-MCNC: 0.7 MG/DL
BUN SERPL-MCNC: 12.3 MG/DL (ref 6–20)
CALCIUM SERPL-MCNC: 9.7 MG/DL (ref 8.6–10)
CHLORIDE SERPL-SCNC: 104 MMOL/L (ref 98–107)
CREAT SERPL-MCNC: 1.1 MG/DL (ref 0.67–1.17)
DEPRECATED HCO3 PLAS-SCNC: 26 MMOL/L (ref 22–29)
EOSINOPHIL # BLD AUTO: 0.3 10E3/UL (ref 0–0.7)
EOSINOPHIL NFR BLD AUTO: 3 %
ERYTHROCYTE [DISTWIDTH] IN BLOOD BY AUTOMATED COUNT: 15.7 % (ref 10–15)
GFR SERPL CREATININE-BSD FRML MDRD: 88 ML/MIN/1.73M2
GLUCOSE SERPL-MCNC: 94 MG/DL (ref 70–99)
HCT VFR BLD AUTO: 44.4 % (ref 40–53)
HGB BLD-MCNC: 14.8 G/DL (ref 13.3–17.7)
IMM GRANULOCYTES # BLD: 0.1 10E3/UL
IMM GRANULOCYTES NFR BLD: 1 %
LDH SERPL L TO P-CCNC: 264 U/L (ref 0–250)
LYMPHOCYTES # BLD AUTO: 2.3 10E3/UL (ref 0.8–5.3)
LYMPHOCYTES NFR BLD AUTO: 21 %
MCH RBC QN AUTO: 33.7 PG (ref 26.5–33)
MCHC RBC AUTO-ENTMCNC: 33.3 G/DL (ref 31.5–36.5)
MCV RBC AUTO: 101 FL (ref 78–100)
MONOCYTES # BLD AUTO: 0.9 10E3/UL (ref 0–1.3)
MONOCYTES NFR BLD AUTO: 8 %
NEUTROPHILS # BLD AUTO: 7.6 10E3/UL (ref 1.6–8.3)
NEUTROPHILS NFR BLD AUTO: 66 %
NRBC # BLD AUTO: 0 10E3/UL
NRBC BLD AUTO-RTO: 0 /100
PLAT MORPH BLD: NORMAL
PLATELET # BLD AUTO: 1385 10E3/UL (ref 150–450)
POTASSIUM SERPL-SCNC: 4.5 MMOL/L (ref 3.4–5.3)
PROT SERPL-MCNC: 7.5 G/DL (ref 6.4–8.3)
RBC # BLD AUTO: 4.39 10E6/UL (ref 4.4–5.9)
RBC MORPH BLD: NORMAL
SODIUM SERPL-SCNC: 140 MMOL/L (ref 136–145)
WBC # BLD AUTO: 11.2 10E3/UL (ref 4–11)

## 2023-07-10 PROCEDURE — 36415 COLL VENOUS BLD VENIPUNCTURE: CPT

## 2023-07-10 PROCEDURE — 85025 COMPLETE CBC W/AUTO DIFF WBC: CPT

## 2023-07-10 PROCEDURE — 83615 LACTATE (LD) (LDH) ENZYME: CPT

## 2023-07-10 PROCEDURE — 80053 COMPREHEN METABOLIC PANEL: CPT

## 2023-07-10 NOTE — CONFIDENTIAL NOTE
Critical Lab Report    Situation:   Lab is reporting the following lab values: Preliminary platelets 1,268 on first run, 1,307 on second run. They will be sending over to [facility] for finalization.    Background/ Assessment:   Treating Provider:   Dr. Alejo     Date of last office visit: 4/12/23    Recent Labs:   CBC RESULTS: Recent Labs   Lab Test 04/06/23  1446   WBC 15.7*   RBC 4.85   HGB 14.7   HCT 45.3   MCV 93   MCH 30.3   MCHC 32.5   RDW 14.2   PLT 1,545*     Per chart review, patient is scheduled for follow up in October.    Recommendations:   Care team notification: routing to care team for further review.

## 2023-07-10 NOTE — TELEPHONE ENCOUNTER
Writer spoke with patient regarding elevated platelet count today. Patient is taking his 1000 mg of hydroxyurea daily and has only missed a few doses since meeting with Dr. Alejo in April of this year. Patient denies any symptoms at this time. Will update provider. Patient to call if he has any questions or concerns.     Mo Scherer RN, BSN.  RN Care Coordinator     [facility]   [phone]

## 2023-10-10 ENCOUNTER — LAB (OUTPATIENT)
Dept: LAB | Facility: CLINIC | Age: 38
End: 2023-10-10
Attending: INTERNAL MEDICINE
Payer: COMMERCIAL

## 2023-10-10 DIAGNOSIS — D47.3 ESSENTIAL THROMBOCYTHEMIA (H): ICD-10-CM

## 2023-10-10 LAB
ALBUMIN SERPL BCG-MCNC: 4.6 G/DL (ref 3.5–5.2)
ALP SERPL-CCNC: 36 U/L (ref 40–129)
ALT SERPL W P-5'-P-CCNC: 34 U/L (ref 0–70)
ANION GAP SERPL CALCULATED.3IONS-SCNC: 9 MMOL/L (ref 7–15)
AST SERPL W P-5'-P-CCNC: 39 U/L (ref 0–45)
BASO+EOS+MONOS # BLD AUTO: ABNORMAL 10*3/UL
BASO+EOS+MONOS NFR BLD AUTO: ABNORMAL %
BASOPHILS # BLD AUTO: 0.1 10E3/UL (ref 0–0.2)
BASOPHILS NFR BLD AUTO: 1 %
BILIRUB SERPL-MCNC: 0.7 MG/DL
BUN SERPL-MCNC: 18.2 MG/DL (ref 6–20)
CALCIUM SERPL-MCNC: 9.9 MG/DL (ref 8.6–10)
CHLORIDE SERPL-SCNC: 102 MMOL/L (ref 98–107)
CREAT SERPL-MCNC: 1.09 MG/DL (ref 0.67–1.17)
DEPRECATED HCO3 PLAS-SCNC: 26 MMOL/L (ref 22–29)
EGFRCR SERPLBLD CKD-EPI 2021: 89 ML/MIN/1.73M2
EOSINOPHIL # BLD AUTO: 0.3 10E3/UL (ref 0–0.7)
EOSINOPHIL NFR BLD AUTO: 3 %
ERYTHROCYTE [DISTWIDTH] IN BLOOD BY AUTOMATED COUNT: 13.2 % (ref 10–15)
GLUCOSE SERPL-MCNC: 83 MG/DL (ref 70–99)
HCT VFR BLD AUTO: 42.4 % (ref 40–53)
HGB BLD-MCNC: 14.3 G/DL (ref 13.3–17.7)
IMM GRANULOCYTES # BLD: 0.1 10E3/UL
IMM GRANULOCYTES NFR BLD: 1 %
LDH SERPL L TO P-CCNC: 269 U/L (ref 0–250)
LYMPHOCYTES # BLD AUTO: 2.5 10E3/UL (ref 0.8–5.3)
LYMPHOCYTES NFR BLD AUTO: 23 %
MCH RBC QN AUTO: 33.6 PG (ref 26.5–33)
MCHC RBC AUTO-ENTMCNC: 33.7 G/DL (ref 31.5–36.5)
MCV RBC AUTO: 100 FL (ref 78–100)
MONOCYTES # BLD AUTO: 0.8 10E3/UL (ref 0–1.3)
MONOCYTES NFR BLD AUTO: 8 %
NEUTROPHILS # BLD AUTO: 6.9 10E3/UL (ref 1.6–8.3)
NEUTROPHILS NFR BLD AUTO: 64 %
NRBC # BLD AUTO: 0 10E3/UL
NRBC BLD AUTO-RTO: 0 /100
PLATELET # BLD AUTO: 1358 10E3/UL (ref 150–450)
POTASSIUM SERPL-SCNC: 4.5 MMOL/L (ref 3.4–5.3)
PROT SERPL-MCNC: 7.5 G/DL (ref 6.4–8.3)
RBC # BLD AUTO: 4.26 10E6/UL (ref 4.4–5.9)
SODIUM SERPL-SCNC: 137 MMOL/L (ref 135–145)
WBC # BLD AUTO: 10.8 10E3/UL (ref 4–11)

## 2023-10-10 PROCEDURE — 85025 COMPLETE CBC W/AUTO DIFF WBC: CPT

## 2023-10-10 PROCEDURE — 83615 LACTATE (LD) (LDH) ENZYME: CPT

## 2023-10-10 PROCEDURE — 36415 COLL VENOUS BLD VENIPUNCTURE: CPT

## 2023-10-10 PROCEDURE — 80053 COMPREHEN METABOLIC PANEL: CPT

## 2023-10-11 ENCOUNTER — VIRTUAL VISIT (OUTPATIENT)
Dept: ONCOLOGY | Facility: CLINIC | Age: 38
End: 2023-10-11
Attending: INTERNAL MEDICINE
Payer: COMMERCIAL

## 2023-10-11 VITALS — BODY MASS INDEX: 24.8 KG/M2 | HEIGHT: 67 IN | WEIGHT: 158 LBS

## 2023-10-11 DIAGNOSIS — D47.3 ESSENTIAL THROMBOCYTHEMIA (H): ICD-10-CM

## 2023-10-11 PROCEDURE — 99213 OFFICE O/P EST LOW 20 MIN: CPT | Mod: VID | Performed by: INTERNAL MEDICINE

## 2023-10-11 RX ORDER — HYDROXYUREA 500 MG/1
1500 CAPSULE ORAL DAILY
Qty: 270 CAPSULE | Refills: 3 | Status: SHIPPED | OUTPATIENT
Start: 2023-10-11 | End: 2023-12-29

## 2023-10-11 ASSESSMENT — PAIN SCALES - GENERAL: PAINLEVEL: NO PAIN (0)

## 2023-10-11 NOTE — PROGRESS NOTES
"[facility]  HEMATOLOGY AND ONCOLOGY    FOLLOW-UP VISIT NOTE    PATIENT NAME: Ismael Britton MRN # [id number]  DATE OF VISIT: Oct 11, 2023 YOB: 1985       REFERRING PROVIDER: Leonard Murillo PA-C  [address] [place] [address]    DIAGNOSIS: Essential thrombocythemia - CALR [id number]    CURRENT INTERVENTIONS:  ASA  Hydroxyurea restarted for 2 weeks    SUBJECTIVE   Ismael Britton is being followed for essential thrombocytosis     Ismael is seen for a scheduled follow up visit for his essential thrombocytosis. He has no new complains. He had discontinued his hydroxyurea. He has just restarted it 2 weeks ago. He is being seen with labs over a video visit.       PAST MEDICAL HISTORY     Past Medical History:   Diagnosis Date    Anxiety 9/2010    Asthma, mild intermittent     COVID-19 vaccination declined 1/5/2022         CURRENT OUTPATIENT MEDICATIONS     Current Outpatient Medications   Medication Sig    cetirizine (ZYRTEC) 10 MG tablet Take 1 tablet by mouth every evening.    fluticasone (FLONASE) 50 MCG/ACT nasal spray 2 sprays by Both Nostrils route daily.    hydroxyurea (HYDREA) 500 MG capsule Take 3 capsules (1,500 mg) by mouth daily     No current facility-administered medications for this visit.        ALLERGIES    No Known Allergies     REVIEW OF SYSTEMS   As above in the HPI, o/w complete 12-point ROS was negative.     PHYSICAL EXAM   Ht 1.702 m (5' 7\")   Wt 71.7 kg (158 lb)   BMI 24.75 kg/m    GEN: NAD  HEENT: PERRL, EOMI, no icterus, injection or pallor. Oropharynx is clear.  LYMPHATICs: no cervical or supraclavicular lymphadenopathy; no other abn lymphadenopathy  PULMONARY: clear with good air entry bilaterally  CARDIOVASCULAR: regular, no murmurs, rubs, or gallops  GASTROINTESTINAL: soft, non-tender, non-distended, normal bowel sounds, no hepatosplenomegaly by percussion or palpation  MUSCULOSKELTAL: warm, well perfused, no edema  NEURO: awake, alert and oriented " "to time place and person, cranial nerves intact - II - XII, no focal neurologic deficits  SKIN: no rashes     LABORATORY AND IMAGING STUDIES     Recent Labs   Lab Test 10/10/23  1251 07/10/23  1259 04/06/23  1446 07/16/21  1455    140 139 137   POTASSIUM 4.5 4.5 4.5 4.2   CHLORIDE 102 104 103 106   CO2 26 26 22 27   ANIONGAP 9 10 14 4   BUN 18.2 12.3 21.2* 16   CR 1.09 1.10 1.30* 1.14   GLC 83 94 81 116*   KAHLIL 9.9 9.7 9.5 9.0     No results for input(s): \"MAG\", \"PHOS\" in the last 45294 hours.  Recent Labs   Lab Test 10/10/23  1251 07/10/23  1259 04/06/23  1446 08/19/21  1544 07/16/21  1455   WBC 10.8 11.2* 15.7* 9.3 11.5*   HGB 14.3 14.8 14.7 15.0 14.7   PLT 1,358* 1,385* 1,545* 970* 1,152*    101* 93 92 92   NEUTROPHIL 64 66 68 67 71     Recent Labs   Lab Test 10/10/23  1251 07/10/23  1259 04/06/23  1446   BILITOTAL 0.7 0.7 0.6   ALKPHOS 36* 36* 36*   ALT 34 26 52*   AST 39 39 42   ALBUMIN 4.6 4.8 4.5   * 264* 267*     TSH   Date Value Ref Range Status   09/02/2010 1.28 0.4 - 5.0 mU/L Final     No results for input(s): \"CEA\" in the last 34180 hours.  No results found for this or any previous visit.  Recent Labs   Lab Test 10/10/23  1251 07/10/23  1259 04/06/23  1446 08/19/21  1544 07/16/21  1455   PLT 1,358* 1,385* 1,545* 970* 1,152*       ASSESSMENT AND PLAN   Essential thrombocythemia - CALR [id number]*47 +ve; low risk disease  ECOG PS 0  No other medical comobridity    I had a lengthy discussion with Ismael, who is followed over video at this telemedicine visit.  His platelet count was normal in 09/2010 at 300,000.  His platelet counts have been markedly elevated since March 2019 and now have increased to over a million. He does have essential thrombocythemia with CALR mutation. This is usually in somatically acquired mutation.     He has low risk disease with his age under 60, lack of JAK2 mutation and no previous thrombosis. The goals of ET management are to alleviate symptoms and minimize " complications of the disease (eg, thrombotic events, bleeding)  His platelet counts had been over 1.5 million while off therapy and he developed acquired von-Willebrands disease. This increases risk of bleeding specially with any concomitant use of ASA or NSAIDS. We restarted him on hydroxyurea but his platelet counts remain elevated at 1.3 million. He is willing to go up on the dose. We will try 1500 mg daily dose now. We will monitor his CBC with diff every 3 months with target platelet counts below 400k. I have entered a new prescription for him.     We again reviewed hydroxyurea which can be used to lower the platelet counts and decrease all of these side effects.  Hydroxyurea is an oral chemotherapeutic agent, which lowers the blood counts, including the white cells, red cells and platelets.  It will lower his platelet counts much more than his hemoglobin or his white cell count. There has been mild decrease in all 3 cell lines.    He has a huge expense from our follow up visits. I will get labs every 3 months  and follow him in annually to review how he is doing as per his requests. I will follow him with CBC, CMP and LDH in 3 months.     Video-Visit Details    Type of service:  Video Visit  Originating Location (pt. Location): Home  Distant Location (provider location):  [facility]   Platform used for Video Visit: [employer]    20 minutes spent on the date of the encounter doing chart review, history and exam, documentation and further activities as noted above      Charlie Alejo    Hematologist and Medical Oncologist  [facility]

## 2023-10-11 NOTE — LETTER
"    10/11/2023         RE: Ismael Britton  [address] [place] [address]        Dear Colleague,    Thank you for referring your patient, Ismael Britton, to the [facility]. Please see a copy of my visit note below.    Virtual Visit Details    Type of service:  Video Visit     Originating Location (pt. Location): Home    Distant Location (provider location):  Off-site  Platform used for Video Visit: [facility]  HEMATOLOGY AND ONCOLOGY    FOLLOW-UP VISIT NOTE    PATIENT NAME: Ismael Britton MRN # [id number]  DATE OF VISIT: Oct 11, 2023 YOB: 1985       REFERRING PROVIDER: Leonard Murillo PA-C  [address] [place] [address]    DIAGNOSIS: Essential thrombocythemia - CALR [id number]    CURRENT INTERVENTIONS:  ASA  Hydroxyurea restarted for 2 weeks    SUBJECTIVE   Ismael Britton is being followed for essential thrombocytosis     Ismael is seen for a scheduled follow up visit for his essential thrombocytosis. He has no new complains. He had discontinued his hydroxyurea. He has just restarted it 2 weeks ago. He is being seen with labs over a video visit.       PAST MEDICAL HISTORY     Past Medical History:   Diagnosis Date    Anxiety 9/2010    Asthma, mild intermittent     COVID-19 vaccination declined 1/5/2022         CURRENT OUTPATIENT MEDICATIONS     Current Outpatient Medications   Medication Sig    cetirizine (ZYRTEC) 10 MG tablet Take 1 tablet by mouth every evening.    fluticasone (FLONASE) 50 MCG/ACT nasal spray 2 sprays by Both Nostrils route daily.    hydroxyurea (HYDREA) 500 MG capsule Take 3 capsules (1,500 mg) by mouth daily     No current facility-administered medications for this visit.        ALLERGIES    No Known Allergies     REVIEW OF SYSTEMS   As above in the HPI, o/w complete 12-point ROS was negative.     PHYSICAL EXAM   Ht 1.702 m (5' 7\")   Wt 71.7 kg (158 lb)   BMI 24.75 kg/m    GEN: NAD  HEENT: IAN, " "EOMI, no icterus, injection or pallor. Oropharynx is clear.  LYMPHATICs: no cervical or supraclavicular lymphadenopathy; no other abn lymphadenopathy  PULMONARY: clear with good air entry bilaterally  CARDIOVASCULAR: regular, no murmurs, rubs, or gallops  GASTROINTESTINAL: soft, non-tender, non-distended, normal bowel sounds, no hepatosplenomegaly by percussion or palpation  MUSCULOSKELTAL: warm, well perfused, no edema  NEURO: awake, alert and oriented to time place and person, cranial nerves intact - II - XII, no focal neurologic deficits  SKIN: no rashes     LABORATORY AND IMAGING STUDIES     Recent Labs   Lab Test 10/10/23  1251 07/10/23  1259 04/06/23  1446 07/16/21  1455    140 139 137   POTASSIUM 4.5 4.5 4.5 4.2   CHLORIDE 102 104 103 106   CO2 26 26 22 27   ANIONGAP 9 10 14 4   BUN 18.2 12.3 21.2* 16   CR 1.09 1.10 1.30* 1.14   GLC 83 94 81 116*   KAHLIL 9.9 9.7 9.5 9.0     No results for input(s): \"MAG\", \"PHOS\" in the last 65582 hours.  Recent Labs   Lab Test 10/10/23  1251 07/10/23  1259 04/06/23  1446 08/19/21  1544 07/16/21  1455   WBC 10.8 11.2* 15.7* 9.3 11.5*   HGB 14.3 14.8 14.7 15.0 14.7   PLT 1,358* 1,385* 1,545* 970* 1,152*    101* 93 92 92   NEUTROPHIL 64 66 68 67 71     Recent Labs   Lab Test 10/10/23  1251 07/10/23  1259 04/06/23  1446   BILITOTAL 0.7 0.7 0.6   ALKPHOS 36* 36* 36*   ALT 34 26 52*   AST 39 39 42   ALBUMIN 4.6 4.8 4.5   * 264* 267*     TSH   Date Value Ref Range Status   09/02/2010 1.28 0.4 - 5.0 mU/L Final     No results for input(s): \"CEA\" in the last 37276 hours.  No results found for this or any previous visit.  Recent Labs   Lab Test 10/10/23  1251 07/10/23  1259 04/06/23  1446 08/19/21  1544 07/16/21  1455   PLT 1,358* 1,385* 1,545* 970* 1,152*       ASSESSMENT AND PLAN   Essential thrombocythemia - CALR [id number]*47 +ve; low risk disease  ECOG PS 0  No other medical comobridity    I had a lengthy discussion with Ismael, who is followed over video at this " telemedicine visit.  His platelet count was normal in 09/2010 at 300,000.  His platelet counts have been markedly elevated since March 2019 and now have increased to over a million. He does have essential thrombocythemia with CALR mutation. This is usually in somatically acquired mutation.     He has low risk disease with his age under 60, lack of JAK2 mutation and no previous thrombosis. The goals of ET management are to alleviate symptoms and minimize complications of the disease (eg, thrombotic events, bleeding)  His platelet counts had been over 1.5 million while off therapy and he developed acquired von-Willebrands disease. This increases risk of bleeding specially with any concomitant use of ASA or NSAIDS. We restarted him on hydroxyurea but his platelet counts remain elevated at 1.3 million. He is willing to go up on the dose. We will try 1500 mg daily dose now. We will monitor his CBC with diff every 3 months with target platelet counts below 400k. I have entered a new prescription for him.     We again reviewed hydroxyurea which can be used to lower the platelet counts and decrease all of these side effects.  Hydroxyurea is an oral chemotherapeutic agent, which lowers the blood counts, including the white cells, red cells and platelets.  It will lower his platelet counts much more than his hemoglobin or his white cell count. There has been mild decrease in all 3 cell lines.    He has a huge expense from our follow up visits. I will get labs every 3 months  and follow him in annually to review how he is doing as per his requests. I will follow him with CBC, CMP and LDH in 3 months.     Video-Visit Details    Type of service:  Video Visit  Originating Location (pt. Location): Home  Distant Location (provider location):  [facility]   Platform used for Video Visit: [employer]    20 minutes spent on the date of the encounter doing chart review, history and exam, documentation and further  activities as noted above      Charlie Alejo    Hematologist and Medical Oncologist  [facility]

## 2023-11-27 ENCOUNTER — OFFICE VISIT (OUTPATIENT)
Dept: FAMILY MEDICINE | Facility: CLINIC | Age: 38
End: 2023-11-27
Payer: COMMERCIAL

## 2023-11-27 VITALS
HEART RATE: 70 BPM | DIASTOLIC BLOOD PRESSURE: 72 MMHG | TEMPERATURE: 98.5 F | WEIGHT: 160.7 LBS | OXYGEN SATURATION: 97 % | RESPIRATION RATE: 16 BRPM | BODY MASS INDEX: 25.22 KG/M2 | SYSTOLIC BLOOD PRESSURE: 111 MMHG | HEIGHT: 67 IN

## 2023-11-27 DIAGNOSIS — Z00.00 ROUTINE HISTORY AND PHYSICAL EXAMINATION OF ADULT: Primary | ICD-10-CM

## 2023-11-27 DIAGNOSIS — D75.839 THROMBOCYTOSIS: ICD-10-CM

## 2023-11-27 DIAGNOSIS — Z82.49 FAMILY HISTORY OF BRAIN ANEURYSM: ICD-10-CM

## 2023-11-27 PROBLEM — Z20.822 ENCOUNTER FOR LABORATORY TESTING FOR COVID-19 VIRUS: Status: RESOLVED | Noted: 2022-01-05 | Resolved: 2023-11-27

## 2023-11-27 PROCEDURE — 99395 PREV VISIT EST AGE 18-39: CPT | Performed by: FAMILY MEDICINE

## 2023-11-27 SDOH — HEALTH STABILITY: PHYSICAL HEALTH: ON AVERAGE, HOW MANY DAYS PER WEEK DO YOU ENGAGE IN MODERATE TO STRENUOUS EXERCISE (LIKE A BRISK WALK)?: 3 DAYS

## 2023-11-27 ASSESSMENT — ENCOUNTER SYMPTOMS
FEVER: 0
MYALGIAS: 0
DYSURIA: 0
COUGH: 0
ARTHRALGIAS: 0
SORE THROAT: 0
EYE PAIN: 0
NERVOUS/ANXIOUS: 1
SHORTNESS OF BREATH: 0
DIARRHEA: 0
HEMATOCHEZIA: 0
FREQUENCY: 0
HEARTBURN: 0
PARESTHESIAS: 0
ABDOMINAL PAIN: 0
HEMATURIA: 0
JOINT SWELLING: 0
NAUSEA: 0
HEADACHES: 0
WEAKNESS: 0
DIZZINESS: 0
CHILLS: 0
CONSTIPATION: 0
PALPITATIONS: 0

## 2023-11-27 ASSESSMENT — SOCIAL DETERMINANTS OF HEALTH (SDOH)
IN A TYPICAL WEEK, HOW MANY TIMES DO YOU TALK ON THE PHONE WITH FAMILY, FRIENDS, OR NEIGHBORS?: TWICE A WEEK
DO YOU BELONG TO ANY CLUBS OR ORGANIZATIONS SUCH AS CHURCH GROUPS UNIONS, FRATERNAL OR ATHLETIC GROUPS, OR SCHOOL GROUPS?: NO
HOW OFTEN DO YOU ATTENT MEETINGS OF THE CLUB OR ORGANIZATION YOU BELONG TO?: PATIENT DECLINED

## 2023-11-27 ASSESSMENT — LIFESTYLE VARIABLES
HOW MANY STANDARD DRINKS CONTAINING ALCOHOL DO YOU HAVE ON A TYPICAL DAY: 1 OR 2
HOW OFTEN DO YOU HAVE A DRINK CONTAINING ALCOHOL: 2-3 TIMES A WEEK

## 2023-11-27 ASSESSMENT — ASTHMA QUESTIONNAIRES: ACT_TOTALSCORE: 25

## 2023-11-27 NOTE — PROGRESS NOTES
SUBJECTIVE:   Ismael is a 38 year old, presenting for the following:  Physical  His main concern is getting screened for brain issues.   He has quite the family hx and his brothers neurologist told the family it was hereditary.    Interestingly his parents do not have issues but 4 of his 6 siblings do.    He feels well otherwise.   His only outside issue is PLT issues.   He sees heme/onc regularly and has frequent labs.              11/27/2023     9:21 AM   Additional Questions   Roomed by Ilene Gomez       Healthy Habits:     Getting at least 3 servings of Calcium per day:  Yes    Bi-annual eye exam:  NO    Dental care twice a year:  Yes    Sleep apnea or symptoms of sleep apnea:  None    Diet:  Regular (no restrictions)    Frequency of exercise:  2-3 days/week    Duration of exercise:  Greater than 60 minutes    Taking medications regularly:  Yes    Medication side effects:  None    Additional concerns today:  Yes      Today's PHQ-2 Score:       11/27/2023     9:16 AM   PHQ-2 ( 1999 [employer])   Q1: Little interest or pleasure in doing things 1   Q2: Feeling down, depressed or hopeless 1   PHQ-2 Score 2   Q1: Little interest or pleasure in doing things Several days   Q2: Feeling down, depressed or hopeless Several days   PHQ-2 Score 2       Past Medical History:   Diagnosis Date    Anxiety 09/2010    COVID-19 vaccination declined 01/05/2022    Seasonal allergic rhinitis        Past Surgical History:   Procedure Laterality Date    SEPTOPLASTY  04/03/2019       MEDICATIONS:  Current Outpatient Medications   Medication    cetirizine (ZYRTEC) 10 MG tablet    fluticasone (FLONASE) 50 MCG/ACT nasal spray    hydroxyurea (HYDREA) 500 MG capsule     No current facility-administered medications for this visit.       SOCIAL HISTORY:  Social History     Tobacco Use    Smoking status: Never    Smokeless tobacco: Never   Substance Use Topics    Alcohol use: Yes     Comment: occasionally       Family History   Problem Relation  "Age of Onset    Thyroid Disease Mother     Lipids Father     Hypertension Father     Other - See Comments Father         Very  low testosterone levels    Heart Surgery Father 42        triple bypass    Hypertension Sister     Seizure Disorder Sister     Cerebrovascular Disease Sister     Neurologic Disorder Sister         brain malformation    Seizure Disorder Sister     Hypertension Brother     Aneurysm Brother         brain malformations    Alcoholism Brother         withdrawal seizures    Brain Hemorrhage Brother         ?aneurysm - hereditary    Cancer Maternal Grandfather         Skin     Kidney Disease Maternal Grandfather         kidney and heart failure       Have you ever done Advance Care Planning? (For example, a Health Directive, POLST, or a discussion with a medical provider or your loved ones about your wishes): No, advance care planning information given to patient to review.  Patient declined advance care planning discussion at this time.    Social History     Tobacco Use    Smoking status: Never    Smokeless tobacco: Never   Substance Use Topics    Alcohol use: No             11/27/2023     9:16 AM   Alcohol Use   Prescreen: >3 drinks/day or >7 drinks/week? No       Last PSA: No results found for: \"PSA\"    Reviewed orders with patient. Reviewed health maintenance and updated orders accordingly - Yes  Labs reviewed in EPIC  BP Readings from Last 3 Encounters:   11/27/23 111/72   01/05/22 130/66   08/23/21 121/66    Wt Readings from Last 3 Encounters:   11/27/23 72.9 kg (160 lb 11.2 oz)   10/11/23 71.7 kg (158 lb)   01/05/22 72.4 kg (159 lb 9.6 oz)                    Reviewed and updated as needed this visit by clinical staff   Tobacco  Allergies  Meds              Reviewed and updated as needed this visit by Provider                     Review of Systems   Constitutional:  Negative for chills and fever.   HENT:  Negative for congestion, ear pain, hearing loss and sore throat.    Eyes:  Negative " "for pain and visual disturbance.   Respiratory:  Negative for cough and shortness of breath.    Cardiovascular:  Negative for chest pain, palpitations and peripheral edema.   Gastrointestinal:  Negative for abdominal pain, constipation, diarrhea, heartburn, hematochezia and nausea.   Genitourinary:  Negative for dysuria, frequency, genital sores, hematuria, impotence, penile discharge and urgency.   Musculoskeletal:  Negative for arthralgias, joint swelling and myalgias.   Skin:  Negative for rash.   Neurological:  Negative for dizziness, weakness, headaches and paresthesias.   Psychiatric/Behavioral:  Negative for mood changes. The patient is nervous/anxious.          OBJECTIVE:   /72 (BP Location: Right arm, Patient Position: Sitting, Cuff Size: Adult Regular)   Pulse 70   Temp 98.5  F (36.9  C) (Oral)   Resp 16   Ht 1.695 m (5' 6.75\")   Wt 72.9 kg (160 lb 11.2 oz)   SpO2 97%   BMI 25.36 kg/m      Physical Exam  GENERAL: healthy, alert and no distress  EYES: Eyes grossly normal to inspection, PERRL and conjunctivae and sclerae normal  HENT: ear canals and TM's normal, nose and mouth without ulcers or lesions  NECK: no adenopathy, no asymmetry, masses, or scars and thyroid normal to palpation  RESP: lungs clear to auscultation - no rales, rhonchi or wheezes  CV: regular rate and rhythm, normal S1 S2, no S3 or S4, no murmur, click or rub, no peripheral edema and peripheral pulses strong  ABDOMEN: soft, nontender, no hepatosplenomegaly, no masses and bowel sounds normal  MS: no gross musculoskeletal defects noted, no edema  SKIN: no suspicious lesions or rashes  NEURO: Normal strength and tone, mentation intact and speech normal  PSYCH: mentation appears normal, affect normal/bright  LYMPH: no cervical, supraclavicular, axillary, or inguinal adenopathy    Diagnostic Test Results:  Labs reviewed in Epic    ASSESSMENT/PLAN:   (Z00.00) Routine history and physical examination of adult  (primary encounter " diagnosis)  Comment:  discussed vaccines   Plan:     (Z82.49) Family history of brain aneurysm  Comment:   Plan: CTA Head Neck with Contrast            (D75.839) Thrombocytosis  Comment: seeing heme  Plan:           COUNSELING:   Reviewed preventive health counseling, as reflected in patient instructions  Special attention given to:        Regular exercise       Immunizations  Declined: all due to Concerns about side effects/safety            He reports that he has never smoked. He has never used smokeless tobacco.          Lady Gonzales MD  [facility]

## 2023-12-21 ENCOUNTER — MYC MEDICAL ADVICE (OUTPATIENT)
Dept: FAMILY MEDICINE | Facility: CLINIC | Age: 38
End: 2023-12-21
Payer: COMMERCIAL

## 2023-12-21 ENCOUNTER — HOSPITAL ENCOUNTER (OUTPATIENT)
Dept: CT IMAGING | Facility: CLINIC | Age: 38
Discharge: HOME | End: 2023-12-21
Attending: FAMILY MEDICINE | Admitting: FAMILY MEDICINE
Payer: COMMERCIAL

## 2023-12-21 DIAGNOSIS — Z82.49 FAMILY HISTORY OF BRAIN ANEURYSM: ICD-10-CM

## 2023-12-21 PROCEDURE — 250N000011 HC RX IP 250 OP 636: Performed by: FAMILY MEDICINE

## 2023-12-21 PROCEDURE — 70498 CT ANGIOGRAPHY NECK: CPT

## 2023-12-21 PROCEDURE — 250N000009 HC RX 250: Performed by: FAMILY MEDICINE

## 2023-12-21 PROCEDURE — 70496 CT ANGIOGRAPHY HEAD: CPT

## 2023-12-21 RX ORDER — IOPAMIDOL 755 MG/ML
500 INJECTION, SOLUTION INTRAVASCULAR ONCE
Status: COMPLETED | OUTPATIENT
Start: 2023-12-21 | End: 2023-12-21

## 2023-12-21 RX ADMIN — SODIUM CHLORIDE 67 ML: 9 INJECTION, SOLUTION INTRAVENOUS at 08:20

## 2023-12-21 RX ADMIN — IOPAMIDOL 67 ML: 755 INJECTION, SOLUTION INTRAVENOUS at 08:21

## 2023-12-21 NOTE — TELEPHONE ENCOUNTER
Mayco Guevara,     CT of the head and neck was negative for aneurysms or narrowing of the arteries. Please let us know if you have any questions.     Thank you,  Carolyn Franco PA-C (covering for Dr. Gonzales)   Written by Carolyn Franco PA-C on 12/21/2023  1:28 PM CST  Seen by patient Ismael S Reba on 12/21/2023  1:56 PM

## 2023-12-21 NOTE — TELEPHONE ENCOUNTER
Dr. Gonzales- see [employer] message below.  Please advise.  Cristiane Holland RN

## 2023-12-29 ENCOUNTER — MYC REFILL (OUTPATIENT)
Dept: ONCOLOGY | Facility: CLINIC | Age: 38
End: 2023-12-29
Payer: COMMERCIAL

## 2023-12-29 DIAGNOSIS — D47.3 ESSENTIAL THROMBOCYTHEMIA (H): ICD-10-CM

## 2023-12-29 RX ORDER — HYDROXYUREA 500 MG/1
1500 CAPSULE ORAL DAILY
Qty: 270 CAPSULE | Refills: 3 | Status: SHIPPED | OUTPATIENT
Start: 2023-12-29

## 2023-12-29 NOTE — CONFIDENTIAL NOTE
Signed Prescriptions:                        Disp   Refills    hydroxyurea (HYDREA) 500 MG capsule        270 ca*3        Sig: Take 3 capsules (1,500 mg) by mouth daily  Authorizing Provider: JOHN ANDINO          Last Written Prescription Date:  10/11/23  Last Fill Quantity: 270,   # refills: 3  Last Office Visit: 10/11/2023  Future Office visit:  10/22/24    Routing refill request to provider for review/approval    Mo Scherer RN, BSN.  RN Care Coordinator     [facility]   [phone]

## 2024-01-17 ENCOUNTER — LAB (OUTPATIENT)
Dept: LAB | Facility: CLINIC | Age: 39
End: 2024-01-17
Payer: COMMERCIAL

## 2024-01-17 ENCOUNTER — NURSE TRIAGE (OUTPATIENT)
Dept: ONCOLOGY | Facility: CLINIC | Age: 39
End: 2024-01-17

## 2024-01-17 DIAGNOSIS — D47.3 ESSENTIAL THROMBOCYTHEMIA (H): ICD-10-CM

## 2024-01-17 LAB
ACANTHOCYTES BLD QL SMEAR: NORMAL
AUER BODIES BLD QL SMEAR: NORMAL
BASO STIPL BLD QL SMEAR: NORMAL
BASOPHILS # BLD AUTO: 0.1 10E3/UL (ref 0–0.2)
BASOPHILS NFR BLD AUTO: 1 %
BITE CELLS BLD QL SMEAR: NORMAL
BLISTER CELLS BLD QL SMEAR: NORMAL
BURR CELLS BLD QL SMEAR: NORMAL
DACRYOCYTES BLD QL SMEAR: NORMAL
ELLIPTOCYTES BLD QL SMEAR: NORMAL
EOSINOPHIL # BLD AUTO: 0.4 10E3/UL (ref 0–0.7)
EOSINOPHIL NFR BLD AUTO: 3 %
ERYTHROCYTE [DISTWIDTH] IN BLOOD BY AUTOMATED COUNT: 13.9 % (ref 10–15)
FRAGMENTS BLD QL SMEAR: NORMAL
HCT VFR BLD AUTO: 46.4 % (ref 40–53)
HGB BLD-MCNC: 15.8 G/DL (ref 13.3–17.7)
HGB C CRYSTALS: NORMAL
HOWELL-JOLLY BOD BLD QL SMEAR: NORMAL
IMM GRANULOCYTES # BLD: 0.1 10E3/UL
IMM GRANULOCYTES NFR BLD: 1 %
LYMPHOCYTES # BLD AUTO: 2.1 10E3/UL (ref 0.8–5.3)
LYMPHOCYTES NFR BLD AUTO: 18 %
MCH RBC QN AUTO: 33.6 PG (ref 26.5–33)
MCHC RBC AUTO-ENTMCNC: 34.1 G/DL (ref 31.5–36.5)
MCV RBC AUTO: 99 FL (ref 78–100)
MONOCYTES # BLD AUTO: 0.8 10E3/UL (ref 0–1.3)
MONOCYTES NFR BLD AUTO: 6 %
NEUTROPHILS # BLD AUTO: 8.7 10E3/UL (ref 1.6–8.3)
NEUTROPHILS NFR BLD AUTO: 71 %
NEUTS HYPERSEG BLD QL SMEAR: NORMAL
NRBC # BLD AUTO: 0 10E3/UL
NRBC BLD AUTO-RTO: 0 /100
PLAT MORPH BLD: NORMAL
PLATELET # BLD AUTO: 1180 10E3/UL (ref 150–450)
POLYCHROMASIA BLD QL SMEAR: NORMAL
RBC # BLD AUTO: 4.7 10E6/UL (ref 4.4–5.9)
RBC AGGLUT BLD QL: NORMAL
RBC MORPH BLD: NORMAL
ROULEAUX BLD QL SMEAR: NORMAL
SICKLE CELLS BLD QL SMEAR: NORMAL
SMUDGE CELLS BLD QL SMEAR: NORMAL
SPHEROCYTES BLD QL SMEAR: NORMAL
STOMATOCYTES BLD QL SMEAR: NORMAL
TARGETS BLD QL SMEAR: NORMAL
TOXIC GRANULES BLD QL SMEAR: NORMAL
VARIANT LYMPHS BLD QL SMEAR: NORMAL
WBC # BLD AUTO: 12.1 10E3/UL (ref 4–11)

## 2024-01-17 PROCEDURE — 83615 LACTATE (LD) (LDH) ENZYME: CPT

## 2024-01-17 PROCEDURE — 80053 COMPREHEN METABOLIC PANEL: CPT

## 2024-01-17 PROCEDURE — 85025 COMPLETE CBC W/AUTO DIFF WBC: CPT

## 2024-01-17 PROCEDURE — 36415 COLL VENOUS BLD VENIPUNCTURE: CPT

## 2024-01-17 NOTE — TELEPHONE ENCOUNTER
DATE/TIME OF CALL RECEIVED FROM LAB:  01/17/24 at 2:42 PM     LAB TEST & LAB VALUE:  Critical-preliminary  Plts 1095    Other Values  Hgb 15.5    Previous Labs from Oct 10th 2023 plts 1358    PROVIDER NOTIFIED?: Yes    PROVIDER NAME: Dr. Alejo    TIME LAB VALUE REPORTED TO PROVIDER:   1168    MECHANISM OF PROVIDER NOTIFICATION: Page    PROVIDER RESPONSE:   6937 Per Dr. Alejo, aware, okay to continue plan next lab draw on 4/17/2024, I agree that it looks better. pt is on hydroxyurea.

## 2024-01-18 LAB
ALBUMIN SERPL BCG-MCNC: 4.9 G/DL (ref 3.5–5.2)
ALP SERPL-CCNC: 37 U/L (ref 40–150)
ALT SERPL W P-5'-P-CCNC: 38 U/L (ref 0–70)
ANION GAP SERPL CALCULATED.3IONS-SCNC: 11 MMOL/L (ref 7–15)
AST SERPL W P-5'-P-CCNC: 34 U/L (ref 0–45)
BILIRUB SERPL-MCNC: 1 MG/DL
BUN SERPL-MCNC: 16.5 MG/DL (ref 6–20)
CALCIUM SERPL-MCNC: 10.3 MG/DL (ref 8.6–10)
CHLORIDE SERPL-SCNC: 102 MMOL/L (ref 98–107)
CREAT SERPL-MCNC: 1.12 MG/DL (ref 0.67–1.17)
DEPRECATED HCO3 PLAS-SCNC: 24 MMOL/L (ref 22–29)
EGFRCR SERPLBLD CKD-EPI 2021: 86 ML/MIN/1.73M2
GLUCOSE SERPL-MCNC: 82 MG/DL (ref 70–99)
LDH SERPL L TO P-CCNC: 287 U/L (ref 0–250)
POTASSIUM SERPL-SCNC: 4.9 MMOL/L (ref 3.4–5.3)
PROT SERPL-MCNC: 7.6 G/DL (ref 6.4–8.3)
SODIUM SERPL-SCNC: 137 MMOL/L (ref 135–145)

## 2024-04-17 ENCOUNTER — LAB (OUTPATIENT)
Dept: LAB | Facility: CLINIC | Age: 39
End: 2024-04-17
Payer: COMMERCIAL

## 2024-04-17 DIAGNOSIS — D47.3 ESSENTIAL THROMBOCYTHEMIA (H): ICD-10-CM

## 2024-04-17 LAB
BASOPHILS # BLD AUTO: 0.1 10E3/UL (ref 0–0.2)
BASOPHILS NFR BLD AUTO: 1 %
EOSINOPHIL # BLD AUTO: 0.2 10E3/UL (ref 0–0.7)
EOSINOPHIL NFR BLD AUTO: 3 %
ERYTHROCYTE [DISTWIDTH] IN BLOOD BY AUTOMATED COUNT: 14.7 % (ref 10–15)
HCT VFR BLD AUTO: 46.2 % (ref 40–53)
HGB BLD-MCNC: 15.9 G/DL (ref 13.3–17.7)
IMM GRANULOCYTES # BLD: 0 10E3/UL
IMM GRANULOCYTES NFR BLD: 1 %
LYMPHOCYTES # BLD AUTO: 1.7 10E3/UL (ref 0.8–5.3)
LYMPHOCYTES NFR BLD AUTO: 21 %
MCH RBC QN AUTO: 34.3 PG (ref 26.5–33)
MCHC RBC AUTO-ENTMCNC: 34.4 G/DL (ref 31.5–36.5)
MCV RBC AUTO: 100 FL (ref 78–100)
MONOCYTES # BLD AUTO: 0.6 10E3/UL (ref 0–1.3)
MONOCYTES NFR BLD AUTO: 8 %
NEUTROPHILS # BLD AUTO: 5.4 10E3/UL (ref 1.6–8.3)
NEUTROPHILS NFR BLD AUTO: 66 %
NRBC # BLD AUTO: 0 10E3/UL
NRBC BLD AUTO-RTO: 0 /100
PLATELET # BLD AUTO: 847 10E3/UL (ref 150–450)
RBC # BLD AUTO: 4.64 10E6/UL (ref 4.4–5.9)
WBC # BLD AUTO: 8 10E3/UL (ref 4–11)

## 2024-04-17 PROCEDURE — 85025 COMPLETE CBC W/AUTO DIFF WBC: CPT

## 2024-04-17 PROCEDURE — 36415 COLL VENOUS BLD VENIPUNCTURE: CPT

## 2024-04-17 PROCEDURE — 83615 LACTATE (LD) (LDH) ENZYME: CPT

## 2024-04-17 PROCEDURE — 80053 COMPREHEN METABOLIC PANEL: CPT

## 2024-04-19 LAB
ALBUMIN SERPL BCG-MCNC: 4.8 G/DL (ref 3.5–5.2)
ALP SERPL-CCNC: 39 U/L (ref 40–150)
ALT SERPL W P-5'-P-CCNC: 24 U/L (ref 0–70)
ANION GAP SERPL CALCULATED.3IONS-SCNC: 13 MMOL/L (ref 7–15)
AST SERPL W P-5'-P-CCNC: 29 U/L (ref 0–45)
BILIRUB SERPL-MCNC: 0.8 MG/DL
BUN SERPL-MCNC: 16.5 MG/DL (ref 6–20)
CALCIUM SERPL-MCNC: 10 MG/DL (ref 8.6–10)
CHLORIDE SERPL-SCNC: 105 MMOL/L (ref 98–107)
CREAT SERPL-MCNC: 1.05 MG/DL (ref 0.67–1.17)
DEPRECATED HCO3 PLAS-SCNC: 23 MMOL/L (ref 22–29)
EGFRCR SERPLBLD CKD-EPI 2021: >90 ML/MIN/1.73M2
GLUCOSE SERPL-MCNC: 59 MG/DL (ref 70–99)
LDH SERPL L TO P-CCNC: 248 U/L (ref 0–250)
POTASSIUM SERPL-SCNC: 5 MMOL/L (ref 3.4–5.3)
PROT SERPL-MCNC: 7.7 G/DL (ref 6.4–8.3)
SODIUM SERPL-SCNC: 141 MMOL/L (ref 135–145)

## 2024-07-17 ENCOUNTER — LAB (OUTPATIENT)
Dept: LAB | Facility: CLINIC | Age: 39
End: 2024-07-17
Payer: COMMERCIAL

## 2024-07-17 DIAGNOSIS — D47.3 ESSENTIAL THROMBOCYTHEMIA (H): ICD-10-CM

## 2024-07-17 LAB
BASOPHILS # BLD AUTO: 0 10E3/UL (ref 0–0.2)
BASOPHILS NFR BLD AUTO: 0 %
EOSINOPHIL # BLD AUTO: 0.1 10E3/UL (ref 0–0.7)
EOSINOPHIL NFR BLD AUTO: 2 %
ERYTHROCYTE [DISTWIDTH] IN BLOOD BY AUTOMATED COUNT: 13.9 % (ref 10–15)
HCT VFR BLD AUTO: 40.2 % (ref 40–53)
HGB BLD-MCNC: 14.4 G/DL (ref 13.3–17.7)
IMM GRANULOCYTES # BLD: 0 10E3/UL
IMM GRANULOCYTES NFR BLD: 0 %
LYMPHOCYTES # BLD AUTO: 1.6 10E3/UL (ref 0.8–5.3)
LYMPHOCYTES NFR BLD AUTO: 28 %
MCH RBC QN AUTO: 37.4 PG (ref 26.5–33)
MCHC RBC AUTO-ENTMCNC: 35.8 G/DL (ref 31.5–36.5)
MCV RBC AUTO: 104 FL (ref 78–100)
MONOCYTES # BLD AUTO: 0.5 10E3/UL (ref 0–1.3)
MONOCYTES NFR BLD AUTO: 9 %
NEUTROPHILS # BLD AUTO: 3.6 10E3/UL (ref 1.6–8.3)
NEUTROPHILS NFR BLD AUTO: 62 %
PLATELET # BLD AUTO: 376 10E3/UL (ref 150–450)
RBC # BLD AUTO: 3.85 10E6/UL (ref 4.4–5.9)
WBC # BLD AUTO: 5.9 10E3/UL (ref 4–11)

## 2024-07-17 PROCEDURE — 83615 LACTATE (LD) (LDH) ENZYME: CPT

## 2024-07-17 PROCEDURE — 80053 COMPREHEN METABOLIC PANEL: CPT

## 2024-07-17 PROCEDURE — 85025 COMPLETE CBC W/AUTO DIFF WBC: CPT

## 2024-07-17 PROCEDURE — 36415 COLL VENOUS BLD VENIPUNCTURE: CPT

## 2024-07-18 LAB
ALBUMIN SERPL BCG-MCNC: 4.7 G/DL (ref 3.5–5.2)
ALP SERPL-CCNC: 33 U/L (ref 40–150)
ALT SERPL W P-5'-P-CCNC: 22 U/L (ref 0–70)
ANION GAP SERPL CALCULATED.3IONS-SCNC: 9 MMOL/L (ref 7–15)
AST SERPL W P-5'-P-CCNC: 24 U/L (ref 0–45)
BILIRUB SERPL-MCNC: 1 MG/DL
BUN SERPL-MCNC: 15.6 MG/DL (ref 6–20)
CALCIUM SERPL-MCNC: 9.3 MG/DL (ref 8.8–10.4)
CHLORIDE SERPL-SCNC: 104 MMOL/L (ref 98–107)
CREAT SERPL-MCNC: 1.03 MG/DL (ref 0.67–1.17)
EGFRCR SERPLBLD CKD-EPI 2021: >90 ML/MIN/1.73M2
GLUCOSE SERPL-MCNC: 75 MG/DL (ref 70–99)
HCO3 SERPL-SCNC: 26 MMOL/L (ref 22–29)
LDH SERPL L TO P-CCNC: 221 U/L (ref 0–250)
POTASSIUM SERPL-SCNC: 4.6 MMOL/L (ref 3.4–5.3)
PROT SERPL-MCNC: 7.1 G/DL (ref 6.4–8.3)
SODIUM SERPL-SCNC: 139 MMOL/L (ref 135–145)

## 2024-08-19 ENCOUNTER — PATIENT OUTREACH (OUTPATIENT)
Dept: ONCOLOGY | Facility: CLINIC | Age: 39
End: 2024-08-19
Payer: COMMERCIAL

## 2024-08-19 NOTE — PROGRESS NOTES
[facility]: Cancer Care                                                                                      Chart review conducted for [employer] Care Coordination Management.      Updated enrollment status.    Perla Manzano, RN, BSN  Oncology RN Care Coordinator  [facility]

## 2024-10-23 ENCOUNTER — NURSE TRIAGE (OUTPATIENT)
Dept: ONCOLOGY | Facility: CLINIC | Age: 39
End: 2024-10-23

## 2024-10-23 ENCOUNTER — LAB (OUTPATIENT)
Dept: LAB | Facility: CLINIC | Age: 39
End: 2024-10-23
Payer: COMMERCIAL

## 2024-10-23 DIAGNOSIS — D47.3 ESSENTIAL THROMBOCYTHEMIA (H): ICD-10-CM

## 2024-10-23 LAB
ALBUMIN SERPL BCG-MCNC: 4.7 G/DL (ref 3.5–5.2)
ALP SERPL-CCNC: 40 U/L (ref 40–150)
ALT SERPL W P-5'-P-CCNC: 28 U/L (ref 0–70)
ANION GAP SERPL CALCULATED.3IONS-SCNC: 12 MMOL/L (ref 7–15)
AST SERPL W P-5'-P-CCNC: 29 U/L (ref 0–45)
BASOPHILS # BLD AUTO: 0.1 10E3/UL (ref 0–0.2)
BASOPHILS NFR BLD AUTO: 1 %
BILIRUB SERPL-MCNC: 0.6 MG/DL
BUN SERPL-MCNC: 16.5 MG/DL (ref 6–20)
CALCIUM SERPL-MCNC: 9.8 MG/DL (ref 8.8–10.4)
CHLORIDE SERPL-SCNC: 105 MMOL/L (ref 98–107)
CREAT SERPL-MCNC: 1.08 MG/DL (ref 0.67–1.17)
EGFRCR SERPLBLD CKD-EPI 2021: 90 ML/MIN/1.73M2
EOSINOPHIL # BLD AUTO: 0.3 10E3/UL (ref 0–0.7)
EOSINOPHIL NFR BLD AUTO: 3 %
ERYTHROCYTE [DISTWIDTH] IN BLOOD BY AUTOMATED COUNT: 12.1 % (ref 10–15)
GLUCOSE SERPL-MCNC: 86 MG/DL (ref 70–99)
HCO3 SERPL-SCNC: 24 MMOL/L (ref 22–29)
HCT VFR BLD AUTO: 45.2 % (ref 40–53)
HGB BLD-MCNC: 15.1 G/DL (ref 13.3–17.7)
IMM GRANULOCYTES # BLD: 0.1 10E3/UL
IMM GRANULOCYTES NFR BLD: 1 %
LDH SERPL L TO P-CCNC: 271 U/L (ref 0–250)
LYMPHOCYTES # BLD AUTO: 2 10E3/UL (ref 0.8–5.3)
LYMPHOCYTES NFR BLD AUTO: 19 %
MCH RBC QN AUTO: 34.2 PG (ref 26.5–33)
MCHC RBC AUTO-ENTMCNC: 33.4 G/DL (ref 31.5–36.5)
MCV RBC AUTO: 102 FL (ref 78–100)
MONOCYTES # BLD AUTO: 0.7 10E3/UL (ref 0–1.3)
MONOCYTES NFR BLD AUTO: 7 %
NEUTROPHILS # BLD AUTO: 7 10E3/UL (ref 1.6–8.3)
NEUTROPHILS NFR BLD AUTO: 69 %
NRBC # BLD AUTO: 0 10E3/UL
NRBC BLD AUTO-RTO: 0 /100
PLAT MORPH BLD: ABNORMAL
PLATELET # BLD AUTO: 1662 10E3/UL (ref 150–450)
POTASSIUM SERPL-SCNC: 4.9 MMOL/L (ref 3.4–5.3)
PROT SERPL-MCNC: 7.4 G/DL (ref 6.4–8.3)
RBC # BLD AUTO: 4.42 10E6/UL (ref 4.4–5.9)
RBC MORPH BLD: ABNORMAL
SODIUM SERPL-SCNC: 141 MMOL/L (ref 135–145)
VARIANT LYMPHS BLD QL SMEAR: PRESENT
WBC # BLD AUTO: 10.2 10E3/UL (ref 4–11)

## 2024-10-23 PROCEDURE — 80053 COMPREHEN METABOLIC PANEL: CPT

## 2024-10-23 PROCEDURE — 83615 LACTATE (LD) (LDH) ENZYME: CPT

## 2024-10-23 PROCEDURE — 85025 COMPLETE CBC W/AUTO DIFF WBC: CPT

## 2024-10-23 PROCEDURE — 36415 COLL VENOUS BLD VENIPUNCTURE: CPT

## 2024-10-23 NOTE — TELEPHONE ENCOUNTER
DATE/TIME OF CALL RECEIVED FROM LAB:  10/23/24 at 1:31 PM     LAB TEST & LAB VALUE:  Critical Plts 1256 prelim  Hgb 14.6    Previous Labs from July 17th [id number]  Hgb14.4    PROVIDER NOTIFIED?: Yes    PROVIDER NAME: dr. Alejo    TIME LAB VALUE REPORTED TO PROVIDER:   1334    MECHANISM OF PROVIDER NOTIFICATION: Page    1337 This writer called pt with lab results, to assess if patient having any s/s of stroke/heart/    1339 This writer made two attempts to all pt and lft voicemail message to call back triage to discuss lab results.     1341 This writer spoke to Ismael, pt states as missed a few doses. Overall feels okay, yesterday did have a headache all day long, then went to bed and woke up and was fine. Did take IBUprofen 600mg in 24hours. Has some intermittent SOB but usually goes away after burping. Thinks mainly allergy induced because after taking allergy meds seems to feel better.   Denies fever, chest pain, headache, dizziness, slurred speech, altered mental status.  Is able to raise both arms accordingly.   Pt lives with family.     PROVIDER RESPONSE:   1348 Per Dr. Alejo, given that patient missed doses of hydroxyurea, likely cause of elevated Platelets. Pt needs to be strict about taking daily dose of Hydroxyurea verified 1500mg daily (3 capsules)  Continue to monitor self closely for any red flag symptoms. Continue current appointments for November to reevaluate.     135 This writer updated pt reminded to take full prescribed dose of hydroxyurea at 1500mg daily until next appts on 11/13 for lab recheck and 11/19 for in-person appt with dr. Alejo. When pt observed from July 17th reading that plts were lower at 376 pt tried self adjusting Hydroxyurea as was worried about obliterating platelets. This writer educated on reference normal range for Plts is 150 to 450 and that last result on 7/17 was on higher end of normal and indicates the current hydroxyurea prescribed was working to maintain platelet levels. Pt  could worry less about obliterating platelets until gets Plts get below 150 and even then Plt transfusion are often only given if plts below 50 for many patients. Pt is aware to keep current prescribed dose of Hydroxyurea of 1500mg as it did result in Plts resulting within normal levels prior. Is aware to monitor for any red flag symptoms e.g. for stroke, cardiac or dvt.  Instructed patient to seek care immediately for worsening symptoms, including: fever, chest pain, shortness of breath, dizziness,headaches, swelling in extremities.

## 2024-11-11 ENCOUNTER — DOCUMENTATION ONLY (OUTPATIENT)
Dept: ONCOLOGY | Facility: CLINIC | Age: 39
End: 2024-11-11
Payer: COMMERCIAL

## 2024-11-11 NOTE — PROGRESS NOTES
"Patient has lab only appt 24 for \"LAB/SINA\", orders in chart have .  Please place new FUTURE/STANDING orders in Epic if appropriate.    Thanks,   [facility]    "

## 2024-12-09 ENCOUNTER — LAB (OUTPATIENT)
Dept: LAB | Facility: CLINIC | Age: 39
End: 2024-12-09
Payer: COMMERCIAL

## 2024-12-09 DIAGNOSIS — D47.3 ESSENTIAL THROMBOCYTHEMIA (H): ICD-10-CM

## 2024-12-09 LAB
ALBUMIN SERPL BCG-MCNC: 4.4 G/DL (ref 3.5–5.2)
ALP SERPL-CCNC: 36 U/L (ref 40–150)
ALT SERPL W P-5'-P-CCNC: 22 U/L (ref 0–70)
ANION GAP SERPL CALCULATED.3IONS-SCNC: 9 MMOL/L (ref 7–15)
AST SERPL W P-5'-P-CCNC: 24 U/L (ref 0–45)
BASOPHILS # BLD AUTO: 0.1 10E3/UL (ref 0–0.2)
BASOPHILS NFR BLD AUTO: 1 %
BILIRUB SERPL-MCNC: 0.5 MG/DL
BUN SERPL-MCNC: 17.7 MG/DL (ref 6–20)
CALCIUM SERPL-MCNC: 8.9 MG/DL (ref 8.8–10.4)
CHLORIDE SERPL-SCNC: 106 MMOL/L (ref 98–107)
CREAT SERPL-MCNC: 1.28 MG/DL (ref 0.67–1.17)
EGFRCR SERPLBLD CKD-EPI 2021: 73 ML/MIN/1.73M2
EOSINOPHIL # BLD AUTO: 0.2 10E3/UL (ref 0–0.7)
EOSINOPHIL NFR BLD AUTO: 4 %
ERYTHROCYTE [DISTWIDTH] IN BLOOD BY AUTOMATED COUNT: 16.9 % (ref 10–15)
GLUCOSE SERPL-MCNC: 88 MG/DL (ref 70–99)
HCO3 SERPL-SCNC: 26 MMOL/L (ref 22–29)
HCT VFR BLD AUTO: 38.3 % (ref 40–53)
HGB BLD-MCNC: 13.7 G/DL (ref 13.3–17.7)
IMM GRANULOCYTES # BLD: 0 10E3/UL
IMM GRANULOCYTES NFR BLD: 0 %
LDH SERPL L TO P-CCNC: 170 U/L (ref 0–250)
LYMPHOCYTES # BLD AUTO: 2 10E3/UL (ref 0.8–5.3)
LYMPHOCYTES NFR BLD AUTO: 35 %
MCH RBC QN AUTO: 35.2 PG (ref 26.5–33)
MCHC RBC AUTO-ENTMCNC: 35.8 G/DL (ref 31.5–36.5)
MCV RBC AUTO: 99 FL (ref 78–100)
MONOCYTES # BLD AUTO: 0.5 10E3/UL (ref 0–1.3)
MONOCYTES NFR BLD AUTO: 8 %
NEUTROPHILS # BLD AUTO: 2.9 10E3/UL (ref 1.6–8.3)
NEUTROPHILS NFR BLD AUTO: 51 %
PLATELET # BLD AUTO: 343 10E3/UL (ref 150–450)
POTASSIUM SERPL-SCNC: 4.3 MMOL/L (ref 3.4–5.3)
PROT SERPL-MCNC: 6.9 G/DL (ref 6.4–8.3)
RBC # BLD AUTO: 3.89 10E6/UL (ref 4.4–5.9)
SODIUM SERPL-SCNC: 141 MMOL/L (ref 135–145)
WBC # BLD AUTO: 5.6 10E3/UL (ref 4–11)

## 2024-12-09 PROCEDURE — 83615 LACTATE (LD) (LDH) ENZYME: CPT

## 2024-12-09 PROCEDURE — 85025 COMPLETE CBC W/AUTO DIFF WBC: CPT

## 2024-12-09 PROCEDURE — 36415 COLL VENOUS BLD VENIPUNCTURE: CPT

## 2024-12-09 PROCEDURE — 80053 COMPREHEN METABOLIC PANEL: CPT

## 2024-12-10 ENCOUNTER — VIRTUAL VISIT (OUTPATIENT)
Dept: ONCOLOGY | Facility: CLINIC | Age: 39
End: 2024-12-10
Attending: INTERNAL MEDICINE
Payer: COMMERCIAL

## 2024-12-10 VITALS — WEIGHT: 147 LBS | BODY MASS INDEX: 23.2 KG/M2

## 2024-12-10 DIAGNOSIS — D47.3 ESSENTIAL THROMBOCYTHEMIA (H): ICD-10-CM

## 2024-12-10 PROCEDURE — 99213 OFFICE O/P EST LOW 20 MIN: CPT | Mod: 95 | Performed by: INTERNAL MEDICINE

## 2024-12-10 RX ORDER — HYDROXYUREA 500 MG/1
1500 CAPSULE ORAL DAILY
Qty: 270 CAPSULE | Refills: 3 | Status: SHIPPED | OUTPATIENT
Start: 2024-12-10

## 2024-12-10 ASSESSMENT — PAIN SCALES - GENERAL: PAINLEVEL_OUTOF10: NO PAIN (0)

## 2024-12-10 NOTE — LETTER
12/10/2024      Ismael Britton  [address]  [facility] [address]      Dear Colleague,    Thank you for referring your patient, Ismael Britton, to the [facility]. Please see a copy of my visit note below.    Virtual Visit Details    Type of service:  Video Visit     Originating Location (pt. Location): Home    Distant Location (provider location):  On-site  Platform used for Video Visit: [facility]  HEMATOLOGY AND ONCOLOGY    FOLLOW-UP VISIT NOTE    PATIENT NAME: Ismael Britton MRN # [id number]  DATE OF VISIT: Dec 10, 2024 YOB: 1985       REFERRING PROVIDER: Leonard Murillo PA-C  [address] [place] [address]    DIAGNOSIS: Essential thrombocythemia - CALR [id number]*47    CURRENT INTERVENTIONS:  ASA  Hydroxyurea 1500 mg daily    SUBJECTIVE   Ismael Britton is being followed for essential thrombocytosis     Ismael is seen for a scheduled follow up visit for his essential thrombocytosis. He has no new complains. He had dropped his hydroxyurea dose from 1500 to 500 mg. His platelet counts alexx quite dramatically. He has restarted at the higher dose now. He has reviewed his labs  He is being seen with labs over a video visit after he showed up at wrong clinic.       PAST MEDICAL HISTORY     Past Medical History:   Diagnosis Date     Anxiety 09/2010     COVID-19 vaccination declined 01/05/2022     Seasonal allergic rhinitis          CURRENT OUTPATIENT MEDICATIONS     Current Outpatient Medications   Medication Sig Dispense Refill     cetirizine (ZYRTEC) 10 MG tablet Take 10 mg by mouth every evening prn 30 tablet 1     fluticasone (FLONASE) 50 MCG/ACT nasal spray 2 sprays by Both Nostrils route daily. 1 Package 12     hydroxyurea (HYDREA) 500 MG capsule Take 3 capsules (1,500 mg) by mouth daily 270 capsule 3     No current facility-administered medications for this visit.        ALLERGIES    No Known Allergies     REVIEW OF SYSTEMS   As  "above in the HPI, o/w complete 12-point ROS was negative.     PHYSICAL EXAM   Wt 66.7 kg (147 lb)   BMI 23.20 kg/m    GEN: NAD  HEENT: PERRL, EOMI, no icterus, injection or pallor. Oropharynx is clear.  LYMPHATICs: no cervical or supraclavicular lymphadenopathy; no other abn lymphadenopathy  PULMONARY: clear with good air entry bilaterally  CARDIOVASCULAR: regular, no murmurs, rubs, or gallops  GASTROINTESTINAL: soft, non-tender, non-distended, normal bowel sounds, no hepatosplenomegaly by percussion or palpation  MUSCULOSKELTAL: warm, well perfused, no edema  NEURO: awake, alert and oriented to time place and person, cranial nerves intact - II - XII, no focal neurologic deficits  SKIN: no rashes     LABORATORY AND IMAGING STUDIES     Recent Labs   Lab Test 12/09/24  0831 10/23/24  1319 07/17/24  1142 04/17/24  1258 01/17/24  1302    141 139 141 137   POTASSIUM 4.3 4.9 4.6 5.0 4.9   CHLORIDE 106 105 104 105 102   CO2 26 24 26 23 24   ANIONGAP 9 12 9 13 11   BUN 17.7 16.5 15.6 16.5 16.5   CR 1.28* 1.08 1.03 1.05 1.12   GLC 88 86 75 59* 82   KAHLIL 8.9 9.8 9.3 10.0 10.3*     No results for input(s): \"MAG\", \"PHOS\" in the last 54995 hours.  Recent Labs   Lab Test 12/09/24  0831 10/23/24  1319 07/17/24  1142 04/17/24  1258 01/17/24  1302   WBC 5.6 10.2 5.9 8.0 12.1*   HGB 13.7 15.1 14.4 15.9 15.8    1,662* 376 847* 1,180*   MCV 99 102* 104* 100 99   NEUTROPHIL 51 69 62 66 71     Recent Labs   Lab Test 12/09/24  0831 10/23/24  1319 07/17/24  1142   BILITOTAL 0.5 0.6 1.0   ALKPHOS 36* 40 33*   ALT 22 28 22   AST 24 29 24   ALBUMIN 4.4 4.7 4.7    271* 221     TSH   Date Value Ref Range Status   09/02/2010 1.28 0.4 - 5.0 mU/L Final     No results for input(s): \"CEA\" in the last 17147 hours.  Results for orders placed or performed during the hospital encounter of 12/21/23   CTA Head Neck with Contrast    Narrative    CT ANGIOGRAM OF THE HEAD AND NECK WITH CONTRAST  12/21/2023 8:26 AM     HISTORY: Family " history of brain aneurysm    TECHNIQUE:  CT angiography with an injection of 67mL Isovue-370 IV  with scans through the head and neck. Images were transferred to a  separate 3-D workstation where multiplanar reformations and 3-D images  were created. Estimates of carotid stenoses are made relative to the  distal internal carotid artery diameters except as noted. Radiation  dose for this scan was reduced using automated exposure control,  adjustment of the mA and/or kV according to patient size, or iterative  reconstruction technique.    COMPARISON: None.     CT HEAD FINDINGS: No contrast enhancing lesions. Cerebral blood flow  is grossly normal.     CT ANGIOGRAM HEAD FINDINGS:  The major intracranial arteries including  the proximal branches of the anterior cerebral, middle cerebral, and  posterior cerebral arteries appear patent without vascular cutoff. No  aneurysm identified. No significant stenosis. Venous circulation is  unremarkable.     CT ANGIOGRAM NECK FINDINGS:   Normal origin of the great vessels from the aortic arch.     Right carotid artery: The right common and internal carotid arteries  are patent. No significant stenosis or atherosclerotic disease in the  carotid artery.     Left carotid artery: The left common and internal carotid arteries are  patent. No significant stenosis or atherosclerotic disease in the  carotid artery.     Vertebral arteries: Vertebral arteries are patent without evidence of  dissection. No significant stenosis.     Other findings: None.       Impression    IMPRESSION: Patent arteries in the head and neck without vascular  cutoff. No evidence of dissection. No aneurysm identified. No  significant stenosis.      RADHA ANDUJAR MD         SYSTEM ID:  [id number]        ASSESSMENT AND PLAN   Essential thrombocythemia - CALR [id number]*47 +ve; low risk disease  ECOG PS 0  No other medical comobridity    I had a lengthy discussion with Ismael, who is followed over video at this  telemedicine visit.  His platelet count was normal in 09/2010 at 300,000.  His platelet counts have been markedly elevated since March 2019 and now have increased to over a million. He does have essential thrombocythemia with CALR mutation. This is usually in somatically acquired mutation.     He has low risk disease with his age under 60, lack of JAK2 mutation and no previous thrombosis. The goals of ET management are to alleviate symptoms and minimize complications of the disease (eg, thrombotic events, bleeding)  His platelet counts had been over 1.5 million while off therapy and he developed acquired von-Willebrands disease. This increases risk of bleeding specially with any concomitant use of ASA or NSAIDS. We restarted him on hydroxyurea but his platelet counts remain elevated at 1.3 million.  We tried 1500 mg daily dose now which has effectively dropped platelet counts to normal range. He was worried that they would go too low and self adjusted the dose. His platelet counts were back to 1.3 million. However they dropped to normal range again after increasing dose to 1500 mg daily.  We will monitor his CBC with diff every 3 months with target platelet counts below 400k. I have entered a new prescription for him.     We again reviewed hydroxyurea which can be used to lower the platelet counts and decrease all of these side effects.  Hydroxyurea is an oral chemotherapeutic agent, which lowers the blood counts, including the white cells, red cells and platelets.  It will lower his platelet counts much more than his hemoglobin or his white cell count. There has been mild decrease in all 3 cell lines.    He has a huge expense from our follow up visits. I will get labs every 3 months  and follow him in annually to review how he is doing as per his requests. I will follow him with CBC, CMP and LDH in 3 months.     Video-Visit Details    Type of service:  Video Visit  Originating Location (pt. Location): Home  Distant  Location (provider location):  [facility]   Platform used for Video Visit: [employer]    20 minutes spent on the date of the encounter doing chart review, history and exam, documentation and further activities as noted above      Charlie Alejo    Hematologist and Medical Oncologist  [facility]         Again, thank you for allowing me to participate in the care of your patient.        Sincerely,        Charlie Alejo MD

## 2024-12-10 NOTE — NURSING NOTE
Is the patient currently in the state of [place]? YES    Current patient location:  pt sitting car at [employer] at [address] [place] [place] [place]    Visit mode:VIDEO    If the visit is dropped, the patient can be reconnected by: VIDEO VISIT: Text to cell phone:   Telephone Information:   Mobile [phone]       Will anyone else be joining the visit? No  (If patient encounters technical issues they should call [phone])    Are changes needed to the allergy or medication list? No    Are refills needed on medications prescribed by this physician? No    Rooming Documentation: Questionnaire(s) completed.    Reason for visit: RECHECK     KATHERINE Mota

## 2024-12-10 NOTE — PROGRESS NOTES
[facility]  HEMATOLOGY AND ONCOLOGY    FOLLOW-UP VISIT NOTE    PATIENT NAME: Ismael Britton MRN # [id number]  DATE OF VISIT: Dec 10, 2024 YOB: 1985       REFERRING PROVIDER: Leonard Murillo PA-C  [address] [place] [address]    DIAGNOSIS: Essential thrombocythemia - CALR [id number]*47    CURRENT INTERVENTIONS:  ASA  Hydroxyurea 1500 mg daily    SUBJECTIVE   Ismael Britton is being followed for essential thrombocytosis     Ismael is seen for a scheduled follow up visit for his essential thrombocytosis. He has no new complains. He had dropped his hydroxyurea dose from 1500 to 500 mg. His platelet counts alexx quite dramatically. He has restarted at the higher dose now. He has reviewed his labs  He is being seen with labs over a video visit after he showed up at wrong clinic.       PAST MEDICAL HISTORY     Past Medical History:   Diagnosis Date    Anxiety 09/2010    COVID-19 vaccination declined 01/05/2022    Seasonal allergic rhinitis          CURRENT OUTPATIENT MEDICATIONS     Current Outpatient Medications   Medication Sig Dispense Refill    cetirizine (ZYRTEC) 10 MG tablet Take 10 mg by mouth every evening prn 30 tablet 1    fluticasone (FLONASE) 50 MCG/ACT nasal spray 2 sprays by Both Nostrils route daily. 1 Package 12    hydroxyurea (HYDREA) 500 MG capsule Take 3 capsules (1,500 mg) by mouth daily 270 capsule 3     No current facility-administered medications for this visit.        ALLERGIES    No Known Allergies     REVIEW OF SYSTEMS   As above in the HPI, o/w complete 12-point ROS was negative.     PHYSICAL EXAM   Wt 66.7 kg (147 lb)   BMI 23.20 kg/m    GEN: NAD  HEENT: PERRL, EOMI, no icterus, injection or pallor. Oropharynx is clear.  LYMPHATICs: no cervical or supraclavicular lymphadenopathy; no other abn lymphadenopathy  PULMONARY: clear with good air entry bilaterally  CARDIOVASCULAR: regular, no murmurs, rubs, or gallops  GASTROINTESTINAL: soft, non-tender,  "non-distended, normal bowel sounds, no hepatosplenomegaly by percussion or palpation  MUSCULOSKELTAL: warm, well perfused, no edema  NEURO: awake, alert and oriented to time place and person, cranial nerves intact - II - XII, no focal neurologic deficits  SKIN: no rashes     LABORATORY AND IMAGING STUDIES     Recent Labs   Lab Test 12/09/24  0831 10/23/24  1319 07/17/24  1142 04/17/24  1258 01/17/24  1302    141 139 141 137   POTASSIUM 4.3 4.9 4.6 5.0 4.9   CHLORIDE 106 105 104 105 102   CO2 26 24 26 23 24   ANIONGAP 9 12 9 13 11   BUN 17.7 16.5 15.6 16.5 16.5   CR 1.28* 1.08 1.03 1.05 1.12   GLC 88 86 75 59* 82   KAHLIL 8.9 9.8 9.3 10.0 10.3*     No results for input(s): \"MAG\", \"PHOS\" in the last 90458 hours.  Recent Labs   Lab Test 12/09/24  0831 10/23/24  1319 07/17/24  1142 04/17/24  1258 01/17/24  1302   WBC 5.6 10.2 5.9 8.0 12.1*   HGB 13.7 15.1 14.4 15.9 15.8    1,662* 376 847* 1,180*   MCV 99 102* 104* 100 99   NEUTROPHIL 51 69 62 66 71     Recent Labs   Lab Test 12/09/24  0831 10/23/24  1319 07/17/24  1142   BILITOTAL 0.5 0.6 1.0   ALKPHOS 36* 40 33*   ALT 22 28 22   AST 24 29 24   ALBUMIN 4.4 4.7 4.7    271* 221     TSH   Date Value Ref Range Status   09/02/2010 1.28 0.4 - 5.0 mU/L Final     No results for input(s): \"CEA\" in the last 29303 hours.  Results for orders placed or performed during the hospital encounter of 12/21/23   CTA Head Neck with Contrast    Narrative    CT ANGIOGRAM OF THE HEAD AND NECK WITH CONTRAST  12/21/2023 8:26 AM     HISTORY: Family history of brain aneurysm    TECHNIQUE:  CT angiography with an injection of 67mL Isovue-370 IV  with scans through the head and neck. Images were transferred to a  separate 3-D workstation where multiplanar reformations and 3-D images  were created. Estimates of carotid stenoses are made relative to the  distal internal carotid artery diameters except as noted. Radiation  dose for this scan was reduced using automated exposure " control,  adjustment of the mA and/or kV according to patient size, or iterative  reconstruction technique.    COMPARISON: None.     CT HEAD FINDINGS: No contrast enhancing lesions. Cerebral blood flow  is grossly normal.     CT ANGIOGRAM HEAD FINDINGS:  The major intracranial arteries including  the proximal branches of the anterior cerebral, middle cerebral, and  posterior cerebral arteries appear patent without vascular cutoff. No  aneurysm identified. No significant stenosis. Venous circulation is  unremarkable.     CT ANGIOGRAM NECK FINDINGS:   Normal origin of the great vessels from the aortic arch.     Right carotid artery: The right common and internal carotid arteries  are patent. No significant stenosis or atherosclerotic disease in the  carotid artery.     Left carotid artery: The left common and internal carotid arteries are  patent. No significant stenosis or atherosclerotic disease in the  carotid artery.     Vertebral arteries: Vertebral arteries are patent without evidence of  dissection. No significant stenosis.     Other findings: None.       Impression    IMPRESSION: Patent arteries in the head and neck without vascular  cutoff. No evidence of dissection. No aneurysm identified. No  significant stenosis.      RADHA ANDUJAR MD         SYSTEM ID:  [id number]        ASSESSMENT AND PLAN   Essential thrombocythemia - CALR [id number]*47 +ve; low risk disease  ECOG PS 0  No other medical comobridity    I had a lengthy discussion with Ismael, who is followed over video at this telemedicine visit.  His platelet count was normal in 09/2010 at 300,000.  His platelet counts have been markedly elevated since March 2019 and now have increased to over a million. He does have essential thrombocythemia with CALR mutation. This is usually in somatically acquired mutation.     He has low risk disease with his age under 60, lack of JAK2 mutation and no previous thrombosis. The goals of ET management are to alleviate  symptoms and minimize complications of the disease (eg, thrombotic events, bleeding)  His platelet counts had been over 1.5 million while off therapy and he developed acquired von-Willebrands disease. This increases risk of bleeding specially with any concomitant use of ASA or NSAIDS. We restarted him on hydroxyurea but his platelet counts remain elevated at 1.3 million.  We tried 1500 mg daily dose now which has effectively dropped platelet counts to normal range. He was worried that they would go too low and self adjusted the dose. His platelet counts were back to 1.3 million. However they dropped to normal range again after increasing dose to 1500 mg daily.  We will monitor his CBC with diff every 3 months with target platelet counts below 400k. I have entered a new prescription for him.     We again reviewed hydroxyurea which can be used to lower the platelet counts and decrease all of these side effects.  Hydroxyurea is an oral chemotherapeutic agent, which lowers the blood counts, including the white cells, red cells and platelets.  It will lower his platelet counts much more than his hemoglobin or his white cell count. There has been mild decrease in all 3 cell lines.    He has a huge expense from our follow up visits. I will get labs every 3 months  and follow him in annually to review how he is doing as per his requests. I will follow him with CBC, CMP and LDH in 3 months.     Video-Visit Details    Type of service:  Video Visit  Originating Location (pt. Location): Home  Distant Location (provider location):  [facility]   Platform used for Video Visit: [employer]    20 minutes spent on the date of the encounter doing chart review, history and exam, documentation and further activities as noted above      Charlie Alejo    Hematologist and Medical Oncologist  [facility]

## 2024-12-10 NOTE — PROGRESS NOTES
Virtual Visit Details    Type of service:  Video Visit     Originating Location (pt. Location): Home    Distant Location (provider location):  On-site  Platform used for Video Visit: [employer]

## 2024-12-11 ENCOUNTER — PATIENT OUTREACH (OUTPATIENT)
Dept: ONCOLOGY | Facility: HOSPITAL | Age: 39
End: 2024-12-11
Payer: COMMERCIAL

## 2024-12-11 NOTE — PROGRESS NOTES
Phoned pt per request: Pt is interested in a therapt appt and writer will ask scheduling team to mounika pt.     Oncology Distress Screening      Row Name 12/10/24 1000       How concerned do you feel regarding the following common issues people with cancer face. Please answer with a number from 0-10 where 0=not concerned and 10=very concerned. PT response of >=8  will result in outreach from Support Team.   1. How concerned are you about your ability to eat? 0       2. How concerned are you about unintended weight loss or your current weight? 0       3. How concerned are you about feeling depressed or very sad? 4       4. How concerned are you about feeling anxious or very scared? 3       5. Do you struggle with the loss of meaning and ramin in your life? Somewhat       6. How concerned are you about work and home life issues that may be affected by your cancer? 0       7. How concerned are you about knowing what resources are available to help you? 1       You can also ask to be contacted by one of our Oncology Supportive Care professionals.   9. If you want to be contacted by one of our professionals, I can send a message to them right now. Oncology Psychologist

## 2025-01-05 ENCOUNTER — HEALTH MAINTENANCE LETTER (OUTPATIENT)
Age: 40
End: 2025-01-05